# Patient Record
Sex: FEMALE | Race: WHITE | URBAN - METROPOLITAN AREA
[De-identification: names, ages, dates, MRNs, and addresses within clinical notes are randomized per-mention and may not be internally consistent; named-entity substitution may affect disease eponyms.]

---

## 2020-01-27 ENCOUNTER — OFFICE VISIT (OUTPATIENT)
Dept: FAMILY MEDICINE CLINIC | Facility: CLINIC | Age: 20
End: 2020-01-27
Payer: COMMERCIAL

## 2020-01-27 VITALS
HEIGHT: 59.84 IN | HEART RATE: 73 BPM | BODY MASS INDEX: 37.69 KG/M2 | SYSTOLIC BLOOD PRESSURE: 116 MMHG | TEMPERATURE: 98 F | DIASTOLIC BLOOD PRESSURE: 73 MMHG | WEIGHT: 192 LBS

## 2020-01-27 DIAGNOSIS — J18.9 COMMUNITY ACQUIRED PNEUMONIA OF LEFT LUNG, UNSPECIFIED PART OF LUNG: Primary | ICD-10-CM

## 2020-01-27 PROCEDURE — 90471 IMMUNIZATION ADMIN: CPT | Performed by: FAMILY MEDICINE

## 2020-01-27 PROCEDURE — 99202 OFFICE O/P NEW SF 15 MIN: CPT | Performed by: FAMILY MEDICINE

## 2020-01-27 PROCEDURE — 90686 IIV4 VACC NO PRSV 0.5 ML IM: CPT | Performed by: FAMILY MEDICINE

## 2020-01-27 NOTE — PROGRESS NOTES
HPI:    Kapil Harry is a 22year old female presents to clinic as a new patient to establish care. Patient was seen in the ER on Sunday, 1/19 with fevers, chills, body aches, and a cough.   She was diagnosed with community-acquired pneumonia, and st normal heart sounds. No murmur heard. Pulmonary/Chest: Effort normal and breath sounds normal. No respiratory distress. She has no wheezes. She has no rales. Lymphadenopathy:     She has no cervical adenopathy. Neurological: She is alert.    Vitals r

## 2020-04-20 ENCOUNTER — TELEMEDICINE (OUTPATIENT)
Dept: FAMILY MEDICINE CLINIC | Facility: CLINIC | Age: 20
End: 2020-04-20

## 2020-04-20 DIAGNOSIS — J30.9 ALLERGIC RHINITIS, UNSPECIFIED SEASONALITY, UNSPECIFIED TRIGGER: Primary | ICD-10-CM

## 2020-04-20 PROCEDURE — 99213 OFFICE O/P EST LOW 20 MIN: CPT | Performed by: FAMILY MEDICINE

## 2020-04-20 RX ORDER — FLUTICASONE PROPIONATE 50 MCG
2 SPRAY, SUSPENSION (ML) NASAL DAILY
Qty: 3 BOTTLE | Refills: 3 | Status: SHIPPED | OUTPATIENT
Start: 2020-04-20 | End: 2021-04-15

## 2020-04-20 NOTE — PROGRESS NOTES
HPI:    Kymberly Tinsley is a 23year old female presents for video visit. For the past 4 days patient has been waking up with headaches, nasal congestion, and sneezing. Has an occasional dry cough.   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 No other symptoms concerning for viral or bacterial infection. Letter written clearing patient to return to work. Patient will update me via my chart in 1 week.     Responsible party/patient verbalized understanding of information

## 2020-05-05 ENCOUNTER — OFFICE VISIT (OUTPATIENT)
Dept: FAMILY MEDICINE CLINIC | Facility: CLINIC | Age: 20
End: 2020-05-05
Payer: COMMERCIAL

## 2020-05-05 VITALS
SYSTOLIC BLOOD PRESSURE: 130 MMHG | DIASTOLIC BLOOD PRESSURE: 90 MMHG | HEIGHT: 59 IN | HEART RATE: 89 BPM | WEIGHT: 189.38 LBS | BODY MASS INDEX: 38.18 KG/M2 | TEMPERATURE: 98 F

## 2020-05-05 DIAGNOSIS — R10.2 VAGINAL PAIN: Primary | ICD-10-CM

## 2020-05-05 PROCEDURE — 99213 OFFICE O/P EST LOW 20 MIN: CPT | Performed by: FAMILY MEDICINE

## 2020-05-05 NOTE — PROGRESS NOTES
HPI:    Nenita Lynch is a 23year old female presents to clinic with concerns regarding vaginal pain. Started 3 days back, patient felt a small bump on the left side of her labia. Used hot compresses and avoided tight underwear.   Reports that pain/ Visit:  No orders of the defined types were placed in this encounter.       Meds This Visit:  Requested Prescriptions      No prescriptions requested or ordered in this encounter       Imaging & Referrals:  None     This note was created by [facility] voice rec

## 2020-07-08 ENCOUNTER — PATIENT MESSAGE (OUTPATIENT)
Dept: FAMILY MEDICINE CLINIC | Facility: CLINIC | Age: 20
End: 2020-07-08

## 2020-07-09 ENCOUNTER — NURSE TRIAGE (OUTPATIENT)
Dept: FAMILY MEDICINE CLINIC | Facility: CLINIC | Age: 20
End: 2020-07-09

## 2020-07-09 NOTE — TELEPHONE ENCOUNTER
Please call patient RE: message below--[employer] message sent to patient to return call and upload photo if possible    Allison Do MD 8 hours ago (11:17 PM)        Hello,     I hope all is well!  I am reaching out because my

## 2020-07-09 NOTE — TELEPHONE ENCOUNTER
Action Requested: Summary for Provider     []  Critical Lab, Recommendations Needed  [] Need Additional Advice  [x]   FYI    []   Need Orders  [] Need Medications Sent to Pharmacy  []  Other     SUMMARY: For the last 3 weeks right foot skin around the toes

## 2020-07-09 NOTE — TELEPHONE ENCOUNTER
From: Duke Mcclain  To: Dionisio Rodarte MD  Sent: 7/8/2020 11:17 PM CDT  Subject: Other    Hello,    I hope all is well! I am reaching out because my right foot has been very itchy & has been peeling in a thick skin layer for about 3 weeks now.  It looke

## 2020-07-11 ENCOUNTER — OFFICE VISIT (OUTPATIENT)
Dept: FAMILY MEDICINE CLINIC | Facility: CLINIC | Age: 20
End: 2020-07-11
Payer: COMMERCIAL

## 2020-07-11 VITALS
WEIGHT: 185.19 LBS | TEMPERATURE: 98 F | HEART RATE: 77 BPM | RESPIRATION RATE: 18 BRPM | SYSTOLIC BLOOD PRESSURE: 121 MMHG | BODY MASS INDEX: 37 KG/M2 | DIASTOLIC BLOOD PRESSURE: 82 MMHG

## 2020-07-11 DIAGNOSIS — B35.3 TINEA PEDIS OF RIGHT FOOT: Primary | ICD-10-CM

## 2020-07-11 PROCEDURE — 99213 OFFICE O/P EST LOW 20 MIN: CPT | Performed by: FAMILY MEDICINE

## 2020-07-11 RX ORDER — PRENATAL VIT 91/IRON/FOLIC/DHA 28-975-200
1 COMBINATION PACKAGE (EA) ORAL 2 TIMES DAILY
Qty: 42 G | Refills: 0 | Status: SHIPPED | OUTPATIENT
Start: 2020-07-11 | End: 2020-08-01

## 2020-07-11 NOTE — PROGRESS NOTES
HPI:    Patient ID: Adrienne Anderson is a 23year old female. Skin   This is a new problem. The current episode started 1 to 4 weeks ago. The problem occurs daily. The problem has been unchanged. Associated symptoms include a rash.  Pertinent negatives i

## 2020-10-08 ENCOUNTER — PATIENT MESSAGE (OUTPATIENT)
Dept: FAMILY MEDICINE CLINIC | Facility: CLINIC | Age: 20
End: 2020-10-08

## 2020-10-09 ENCOUNTER — NURSE TRIAGE (OUTPATIENT)
Dept: FAMILY MEDICINE CLINIC | Facility: CLINIC | Age: 20
End: 2020-10-09

## 2020-10-09 NOTE — TELEPHONE ENCOUNTER
From: Mery Munoz  To: Ronnie Galloway MD  Sent: 10/8/2020 9:39 PM CDT  Subject: Other    Hello,     I am sorry to bother you! I got my nipples pierced roughly about a month and a half ago.  I noticed a redish/whiteish lump grow ; it bled and pus starte

## 2020-10-09 NOTE — TELEPHONE ENCOUNTER
Action Requested: Summary for Provider     []  Critical Lab, Recommendations Needed  [] Need Additional Advice  [x]   FYI    []   Need Orders  [] Need Medications Sent to Pharmacy  []  Other     SUMMARY:   Called the patient to triage regarding mychart mes

## 2020-10-09 NOTE — TELEPHONE ENCOUNTER
Advised to call Triage on a [employer] message    ----- Message from Delia Salazar sent at 10/8/2020  9:39 PM CDT -----  Regarding: Other  Contact: [phone],     I am sorry to bother you!  I got my nipples pierced roughly about a month and a half

## 2021-02-01 ENCOUNTER — TELEMEDICINE (OUTPATIENT)
Dept: FAMILY MEDICINE CLINIC | Facility: CLINIC | Age: 21
End: 2021-02-01

## 2021-02-01 DIAGNOSIS — R53.83 FATIGUE, UNSPECIFIED TYPE: ICD-10-CM

## 2021-02-01 DIAGNOSIS — R52 BODY ACHES: ICD-10-CM

## 2021-02-01 DIAGNOSIS — R50.9 FEVER, UNSPECIFIED FEVER CAUSE: Primary | ICD-10-CM

## 2021-02-01 DIAGNOSIS — J02.9 SORE THROAT: ICD-10-CM

## 2021-02-01 PROCEDURE — 99213 OFFICE O/P EST LOW 20 MIN: CPT | Performed by: FAMILY MEDICINE

## 2021-02-01 RX ORDER — AMOXICILLIN AND CLAVULANATE POTASSIUM 875; 125 MG/1; MG/1
1 TABLET, FILM COATED ORAL 2 TIMES DAILY
Qty: 20 TABLET | Refills: 0 | Status: SHIPPED | OUTPATIENT
Start: 2021-02-01 | End: 2021-02-11

## 2021-02-01 NOTE — PROGRESS NOTES
HPI:    Olegario Elias is a 23year old female presents for video visit with 1 week history of low-grade fevers, chills, sore throat, cough, and body aches.   Symptoms started 2 days after patient returned from [facility], was Covid tested 5 days upon her r for clearance to return to work. Please note that the following visit was completed using two-way, real-time interactive audio and video communication.   This has been done in good vivienne to provide continuity of care in the best interest of the provider-

## 2021-02-08 ENCOUNTER — TELEMEDICINE (OUTPATIENT)
Dept: FAMILY MEDICINE CLINIC | Facility: CLINIC | Age: 21
End: 2021-02-08

## 2021-02-08 DIAGNOSIS — N94.9 VAGINAL DISCOMFORT: ICD-10-CM

## 2021-02-08 DIAGNOSIS — J06.9 VIRAL URI: Primary | ICD-10-CM

## 2021-02-08 PROCEDURE — 99213 OFFICE O/P EST LOW 20 MIN: CPT | Performed by: FAMILY MEDICINE

## 2021-02-08 NOTE — PROGRESS NOTES
HPI:    Nenita Lynch is a 23year old female presents for video visit for follow-up. Reports that symptoms of her upper respiratory infection are improving. Has not had a fever for 5 days. Cough is improving, still has some nasal congestion.   Had discomfort  Plan:  -Advised warm compresses. To continue to wear loose clothing. Advised against attempting to shave or any sort of hair removal in this area. Requesting an office exam, appointment made for tomorrow afternoon.   Patient agreeable to plan

## 2021-02-09 ENCOUNTER — OFFICE VISIT (OUTPATIENT)
Dept: FAMILY MEDICINE CLINIC | Facility: CLINIC | Age: 21
End: 2021-02-09
Payer: COMMERCIAL

## 2021-02-09 ENCOUNTER — LAB ENCOUNTER (OUTPATIENT)
Dept: LAB | Age: 21
End: 2021-02-09
Attending: FAMILY MEDICINE
Payer: COMMERCIAL

## 2021-02-09 VITALS
DIASTOLIC BLOOD PRESSURE: 83 MMHG | WEIGHT: 177 LBS | SYSTOLIC BLOOD PRESSURE: 125 MMHG | HEIGHT: 59 IN | TEMPERATURE: 98 F | HEART RATE: 80 BPM | RESPIRATION RATE: 16 BRPM | BODY MASS INDEX: 35.68 KG/M2

## 2021-02-09 DIAGNOSIS — J06.9 VIRAL URI WITH COUGH: ICD-10-CM

## 2021-02-09 DIAGNOSIS — Z11.3 SCREENING EXAMINATION FOR STD (SEXUALLY TRANSMITTED DISEASE): ICD-10-CM

## 2021-02-09 DIAGNOSIS — N94.9 GENITAL LESION, FEMALE: Primary | ICD-10-CM

## 2021-02-09 DIAGNOSIS — Z71.85 HPV VACCINE COUNSELING: ICD-10-CM

## 2021-02-09 LAB
C TRACH DNA SPEC QL NAA+PROBE: NEGATIVE
N GONORRHOEA DNA SPEC QL NAA+PROBE: NEGATIVE

## 2021-02-09 PROCEDURE — 3074F SYST BP LT 130 MM HG: CPT | Performed by: FAMILY MEDICINE

## 2021-02-09 PROCEDURE — 99214 OFFICE O/P EST MOD 30 MIN: CPT | Performed by: FAMILY MEDICINE

## 2021-02-09 PROCEDURE — 90471 IMMUNIZATION ADMIN: CPT | Performed by: FAMILY MEDICINE

## 2021-02-09 PROCEDURE — 87491 CHLMYD TRACH DNA AMP PROBE: CPT

## 2021-02-09 PROCEDURE — 90651 9VHPV VACCINE 2/3 DOSE IM: CPT | Performed by: FAMILY MEDICINE

## 2021-02-09 PROCEDURE — 3008F BODY MASS INDEX DOCD: CPT | Performed by: FAMILY MEDICINE

## 2021-02-09 PROCEDURE — 86780 TREPONEMA PALLIDUM: CPT

## 2021-02-09 PROCEDURE — 3079F DIAST BP 80-89 MM HG: CPT | Performed by: FAMILY MEDICINE

## 2021-02-09 PROCEDURE — 87389 HIV-1 AG W/HIV-1&-2 AB AG IA: CPT

## 2021-02-09 PROCEDURE — 36415 COLL VENOUS BLD VENIPUNCTURE: CPT

## 2021-02-09 PROCEDURE — 87591 N.GONORRHOEAE DNA AMP PROB: CPT

## 2021-02-09 NOTE — PROGRESS NOTES
HPI:    Lila Ogden is a 23year old female presents to clinic for follow-up. Last week, patient had symptoms of viral URI, tested negative for Covid. Still has some nasal congestion and a mild dry cough. Needs clearance to return to work.   Also, oropharynx is clear and moist and mucous membranes are normal.   Eyes: Pupils are equal, round, and reactive to light. Conjunctivae and EOM are normal.   Neck: Normal range of motion. Neck supple. No thyromegaly present.    Cardiovascular: Normal rate, regu HUMAN PAPILLOMA VIRUS VACC 9 FRANCISCO 3 DOSE IM       This note was created by [employer] voice recognition.  Errors in content may be related to improper recognition by the system; efforts to review and correct have been done but errors may still exist. Please cont

## 2021-02-10 ENCOUNTER — TELEPHONE (OUTPATIENT)
Dept: FAMILY MEDICINE CLINIC | Facility: CLINIC | Age: 21
End: 2021-02-10

## 2021-02-10 LAB
HSV1 DNA SPEC QL NAA+PROBE: POSITIVE
HSV2 DNA SPEC QL NAA+PROBE: NEGATIVE
T PALLIDUM AB SER QL: NEGATIVE

## 2021-02-10 RX ORDER — VALACYCLOVIR HYDROCHLORIDE 1 G/1
1 TABLET, FILM COATED ORAL EVERY 12 HOURS SCHEDULED
Qty: 14 TABLET | Refills: 0 | Status: SHIPPED | OUTPATIENT
Start: 2021-02-10 | End: 2021-02-17

## 2021-02-10 NOTE — TELEPHONE ENCOUNTER
Positive HSV swab discussed with patient. Valtrex 1 g twice daily x7 days to pharmacy. Safe sexual practices advised. HSV and potential future flareups discussed.   Follow-up as needed    Responsible party/patient verbalized understanding of information

## 2021-03-02 ENCOUNTER — PATIENT MESSAGE (OUTPATIENT)
Dept: FAMILY MEDICINE CLINIC | Facility: CLINIC | Age: 21
End: 2021-03-02

## 2021-03-02 ENCOUNTER — NURSE TRIAGE (OUTPATIENT)
Dept: FAMILY MEDICINE CLINIC | Facility: CLINIC | Age: 21
End: 2021-03-02

## 2021-03-02 NOTE — TELEPHONE ENCOUNTER
Encounter routed to the triage pool for RN follow-up.         ----- Message from Dominic Justin sent at 3/2/2021  9:56 AM CST -----  Regarding: Other  Contact: [address],     I'm sorry to bother so much.  There's always something wrong

## 2021-03-02 NOTE — TELEPHONE ENCOUNTER
From: Matthew De Paz  To: Geoffrey Sorenson MD  Sent: 3/2/2021 9:56 AM CST  Subject: Other    [address],     I'm sorry to bother so much. There's always something wrong with me!      My right leg hurts when I walk and put pressure on it closer to my pel

## 2021-03-02 NOTE — TELEPHONE ENCOUNTER
Action Requested: Summary for Provider     []  Critical Lab, Recommendations Needed  [] Need Additional Advice  []   FYI    []   Need Orders  [] Need Medications Sent to Pharmacy  []  Other     SUMMARY:   Reports right leg pain/ groin area for 7 days, wors

## 2021-03-03 ENCOUNTER — OFFICE VISIT (OUTPATIENT)
Dept: FAMILY MEDICINE CLINIC | Facility: CLINIC | Age: 21
End: 2021-03-03
Payer: COMMERCIAL

## 2021-03-03 VITALS
DIASTOLIC BLOOD PRESSURE: 84 MMHG | WEIGHT: 180 LBS | HEART RATE: 76 BPM | HEIGHT: 59 IN | SYSTOLIC BLOOD PRESSURE: 130 MMHG | BODY MASS INDEX: 36.29 KG/M2 | TEMPERATURE: 98 F

## 2021-03-03 DIAGNOSIS — M79.604 DIFFUSE PAIN IN RIGHT LOWER EXTREMITY: ICD-10-CM

## 2021-03-03 DIAGNOSIS — M25.559 HIP PAIN: ICD-10-CM
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
## 2021-03-03 PROCEDURE — 3079F DIAST BP 80-89 MM HG: CPT | Performed by: FAMILY MEDICINE

## 2021-03-03 PROCEDURE — 3075F SYST BP GE 130 - 139MM HG: CPT | Performed by: FAMILY MEDICINE

## 2021-03-03 PROCEDURE — 99214 OFFICE O/P EST MOD 30 MIN: CPT | Performed by: FAMILY MEDICINE

## 2021-03-03 PROCEDURE — 3008F BODY MASS INDEX DOCD: CPT | Performed by: FAMILY MEDICINE

## 2021-03-03 RX ORDER — NAPROXEN 500 MG/1
500 TABLET ORAL 2 TIMES DAILY WITH MEALS
Qty: 60 TABLET | Refills: 0 | Status: SHIPPED | OUTPATIENT
Start: 2021-03-03

## 2021-03-03 NOTE — PROGRESS NOTES
HPI:    Lia Haines is a 23year old female presents to clinic with concerns regarding right lower extremity pain. About 10 days back, patient slipped while trying to remove snow from her boyfriend's car.   Reports that she slipped on her right foot pain with flexion/extension  Antalgic gait   Neurological: She is alert. Psychiatric: She has a normal mood and affect. Vitals reviewed.       ASSESSMENT/PLAN:   (Y90.VZLR) Injury due to fall, initial encounter  (primary encounter diagnosis)  (M25.557)

## 2021-03-06 ENCOUNTER — HOSPITAL ENCOUNTER (OUTPATIENT)
Dept: GENERAL RADIOLOGY | Age: 21
Discharge: HOME | End: 2021-03-06
Attending: FAMILY MEDICINE
Payer: COMMERCIAL

## 2021-03-06 DIAGNOSIS — M79.604 DIFFUSE PAIN IN RIGHT LOWER EXTREMITY: ICD-10-CM

## 2021-03-06 DIAGNOSIS — M25.559 HIP PAIN: ICD-10-CM

## 2021-03-06 PROCEDURE — 73502 X-RAY EXAM HIP UNI 2-3 VIEWS: CPT | Performed by: FAMILY MEDICINE

## 2021-03-06 PROCEDURE — 73564 X-RAY EXAM KNEE 4 OR MORE: CPT | Performed by: FAMILY MEDICINE

## 2021-04-03 ENCOUNTER — NURSE TRIAGE (OUTPATIENT)
Dept: FAMILY MEDICINE CLINIC | Facility: CLINIC | Age: 21
End: 2021-04-03

## 2021-04-03 NOTE — TELEPHONE ENCOUNTER
Action Requested: Summary for Provider     []  Critical Lab, Recommendations Needed  [] Need Additional Advice  []   FYI    []   Need Orders  [] Need Medications Sent to Pharmacy  []  Other     SUMMARY:   Blood in stool today.   Feels its related to Scott Baker

## 2021-04-03 NOTE — TELEPHONE ENCOUNTER
Patient reports blood in stool.     Appointment For: Karolina Love ([id number])   Visit Type: Lou Rich ([id number])      4/7/2021     1:15 PM  15 mins. Debi Collet, MD        [facility]      Patient Comments:   Blood in stool

## 2021-04-09 DIAGNOSIS — Z23 NEED FOR VACCINATION: ICD-10-CM

## 2021-09-22 ENCOUNTER — OFFICE VISIT (OUTPATIENT)
Dept: FAMILY MEDICINE CLINIC | Facility: CLINIC | Age: 21
End: 2021-09-22
Payer: COMMERCIAL

## 2021-09-22 VITALS
TEMPERATURE: 98 F | DIASTOLIC BLOOD PRESSURE: 88 MMHG | HEART RATE: 68 BPM | WEIGHT: 185.5 LBS | HEIGHT: 59 IN | SYSTOLIC BLOOD PRESSURE: 137 MMHG | BODY MASS INDEX: 37.4 KG/M2

## 2021-09-22 DIAGNOSIS — N89.8 VAGINAL ITCHING: Primary | ICD-10-CM

## 2021-09-22 DIAGNOSIS — Z12.4 PAP SMEAR FOR CERVICAL CANCER SCREENING: ICD-10-CM

## 2021-09-22 DIAGNOSIS — Z23 FLU VACCINE NEED: ICD-10-CM

## 2021-09-22 DIAGNOSIS — B00.9 HSV INFECTION: ICD-10-CM

## 2021-09-22 DIAGNOSIS — N92.6 MISSED MENSES: ICD-10-CM

## 2021-09-22 LAB
CONTROL LINE PRESENT WITH A CLEAR BACKGROUND (YES/NO): YES YES/NO
PREGNANCY TEST, URINE: NEGATIVE

## 2021-09-22 PROCEDURE — 3075F SYST BP GE 130 - 139MM HG: CPT | Performed by: FAMILY MEDICINE

## 2021-09-22 PROCEDURE — 90686 IIV4 VACC NO PRSV 0.5 ML IM: CPT | Performed by: FAMILY MEDICINE

## 2021-09-22 PROCEDURE — 81025 URINE PREGNANCY TEST: CPT | Performed by: FAMILY MEDICINE

## 2021-09-22 PROCEDURE — 3008F BODY MASS INDEX DOCD: CPT | Performed by: FAMILY MEDICINE

## 2021-09-22 PROCEDURE — 90471 IMMUNIZATION ADMIN: CPT | Performed by: FAMILY MEDICINE

## 2021-09-22 PROCEDURE — 3079F DIAST BP 80-89 MM HG: CPT | Performed by: FAMILY MEDICINE

## 2021-09-22 PROCEDURE — 99214 OFFICE O/P EST MOD 30 MIN: CPT | Performed by: FAMILY MEDICINE

## 2021-09-22 RX ORDER — VALACYCLOVIR HYDROCHLORIDE 1 G/1
1 TABLET, FILM COATED ORAL EVERY 12 HOURS SCHEDULED
Qty: 30 TABLET | Refills: 1 | Status: SHIPPED | OUTPATIENT
Start: 2021-09-22

## 2021-09-22 NOTE — PROGRESS NOTES
HPI:    Christiana Evans is a 19year old female presents to clinic with concerns regarding vaginal itching. Symptoms started a few days back, thought she was getting yeast infection.   Has not noticed discharge, thinks she is experiencing seeing a flare ASSESSMENT/PLAN:   (N89.8) Vaginal itching  (primary encounter diagnosis)  (N92.6) Missed menses  (Z12.4) Pap smear for cervical cancer screening  (B00.9) HSV infection  Plan:   -No discharge seen on exam, HSV lesions seen on left labia.   Valtrex t

## 2021-09-23 LAB
C TRACH DNA SPEC QL NAA+PROBE: NEGATIVE
N GONORRHOEA DNA SPEC QL NAA+PROBE: NEGATIVE

## 2021-09-24 LAB
GENITAL VAGINOSIS SCREEN: POSITIVE
TRICHOMONAS SCREEN: NEGATIVE

## 2021-09-28 LAB — HPV I/H RISK 1 DNA SPEC QL NAA+PROBE: NEGATIVE

## 2021-09-29 LAB — LAST PAP RESULT: NORMAL

## 2021-10-05 RX ORDER — METRONIDAZOLE 500 MG/1
500 TABLET ORAL 2 TIMES DAILY
Qty: 14 TABLET | Refills: 0 | Status: SHIPPED | OUTPATIENT
Start: 2021-10-05 | End: 2021-10-12

## 2022-01-21 NOTE — PATIENT INSTRUCTIONS
Athlete’s Foot     Athlete’s foot (tinea pedis) is caused by a fungal infection in the skin. It affects the skin between the toes, causing cracks in the skin called fissures.  It can also affect the bottom of the foot where it causes dry white scales and · Increasing redness or swelling of the foot  · Infection comes back soon after treatment  · Pus draining from cracks in the skin  [place] last reviewed this educational content on 7/1/2019  © [id number] [employer].  [address] Peng Advancement Flap Text: The defect edges were debeveled with a #15 scalpel blade.  Given the location of the defect, shape of the defect and the proximity to free margins a Peng advancement flap was deemed most appropriate.  Using a sterile surgical marker, an appropriate advancement flap was drawn incorporating the defect and placing the expected incisions within the relaxed skin tension lines where possible. The area thus outlined was incised deep to adipose tissue with a #15 scalpel blade.  The skin margins were undermined to an appropriate distance in all directions utilizing iris scissors.

## 2024-11-07 ENCOUNTER — NURSE TRIAGE (OUTPATIENT)
Dept: FAMILY MEDICINE CLINIC | Facility: CLINIC | Age: 24
End: 2024-11-07

## 2024-11-07 ENCOUNTER — OFFICE VISIT (OUTPATIENT)
Dept: INTERNAL MEDICINE CLINIC | Facility: CLINIC | Age: 24
End: 2024-11-07

## 2024-11-07 VITALS
WEIGHT: 170 LBS | DIASTOLIC BLOOD PRESSURE: 85 MMHG | BODY MASS INDEX: 34.27 KG/M2 | HEIGHT: 59 IN | HEART RATE: 81 BPM | SYSTOLIC BLOOD PRESSURE: 141 MMHG

## 2024-11-07 DIAGNOSIS — N93.9 ABNORMAL UTERINE BLEEDING (AUB): Primary | ICD-10-CM

## 2024-11-07 DIAGNOSIS — Z11.3 VENEREAL DISEASE SCREENING: ICD-10-CM

## 2024-11-07 PROCEDURE — 3079F DIAST BP 80-89 MM HG: CPT | Performed by: NURSE PRACTITIONER

## 2024-11-07 PROCEDURE — 3008F BODY MASS INDEX DOCD: CPT | Performed by: NURSE PRACTITIONER

## 2024-11-07 PROCEDURE — 3077F SYST BP >= 140 MM HG: CPT | Performed by: NURSE PRACTITIONER

## 2024-11-07 PROCEDURE — 99203 OFFICE O/P NEW LOW 30 MIN: CPT | Performed by: NURSE PRACTITIONER

## 2024-11-07 NOTE — TELEPHONE ENCOUNTER
Action Requested: Summary for Provider     []  Critical Lab, Recommendations Needed  [] Need Additional Advice  [x]   FYI    []   Need Orders  [] Need Medications Sent to Pharmacy  []  Other     SUMMARY: Patient stated that last year she had a miscarriage. Since then has noticed that every time she does the stair master or any abdominal workouts the next day she starts having abdominal pressure in the middle below the belly button and then starts having vaginal spotting and bleeding for about 2-4 days.  Last time she worked out was on 11/2/2024 and did the stair master for about 30 minutes and started having the abdominal pressure. Patient stated that when she was pregnant and spotting last year she went to the emergency room and they did see she had ovarian cysts. No other symptoms. Patient not sure if symptoms related to her miscarriage last year but wanted to get evaluated to see what is going on. No appointments in [place] today, not until next week. Appointment made for today at 7pm with Shital Thompson in [place]-address provided.   Reason for call: Vaginal Bleeding (Abnormal vaginal bleeding)  Onset: Data Unavailable     Reason for Disposition   Patient wants to be seen    Protocols used: Vaginal Bleeding - [id number]

## 2024-11-08 NOTE — PROGRESS NOTES
Lorri Pritchard is a 24 year old female.  HPI:   Pt is new to me.   She c/o irregular vaginal bleeding/spotting that occurs with strenuous activity such as working out. This is ongoing to a few months. She reports some pelvic pain as well. She reports regular menses every 28 days. Reports she is sexually active, male partner, no new partners, denies any vaginal discharge, fever, chills, urinary. Denies any pain with intercourse or bleeding.   LMP 10/24/2024  , miscarriage.   Current Outpatient Medications   Medication Sig Dispense Refill    valACYclovir 1 G Oral Tab Take 1 tablet (1,000 mg total) by mouth every 12 (twelve) hours. 30 tablet 1    naproxen 500 MG Oral Tab Take 1 tablet (500 mg total) by mouth 2 (two) times daily with meals. (Patient not taking: Reported on 2024) 60 tablet 0      History reviewed. No pertinent past medical history.   Social History:  Social History     Socioeconomic History    Marital status: Single   Tobacco Use    Smoking status: Never    Smokeless tobacco: Never   Vaping Use    Vaping status: Never Used   Substance and Sexual Activity    Alcohol use: Not Currently    Drug use: Never        REVIEW OF SYSTEMS:   Review of Systems   Constitutional:  Negative for activity change, appetite change, fatigue, fever and unexpected weight change.   HENT:  Negative for congestion, dental problem and sore throat.    Eyes:  Negative for visual disturbance.   Respiratory:  Negative for cough, chest tightness, shortness of breath and wheezing.    Cardiovascular:  Negative for chest pain, palpitations and leg swelling.   Gastrointestinal:  Negative for abdominal pain, constipation, diarrhea, nausea and vomiting.   Endocrine: Negative.    Genitourinary:  Negative for difficulty urinating, frequency and menstrual problem.   Musculoskeletal:  Negative for arthralgias and back pain.   Skin:  Negative for color change, pallor, rash and wound.   Neurological:  Negative for dizziness, seizures,  light-headedness, numbness and headaches.   Psychiatric/Behavioral:  Negative for behavioral problems, dysphoric mood and suicidal ideas. The patient is not nervous/anxious.           EXAM:   /85 (BP Location: Left arm, Patient Position: Sitting, Cuff Size: adult)   Pulse 81   Ht 4' 11\" (1.499 m)   Wt 170 lb (77.1 kg)   BMI 34.34 kg/m²     Physical Exam  Vitals reviewed.   Constitutional:       General: She is not in acute distress.     Appearance: Normal appearance. She is normal weight. She is not ill-appearing.   HENT:      Head: Normocephalic and atraumatic.   Eyes:      General: No scleral icterus.     Conjunctiva/sclera: Conjunctivae normal.      Pupils: Pupils are equal, round, and reactive to light.   Neck:      Thyroid: No thyroid mass or thyromegaly.   Cardiovascular:      Rate and Rhythm: Normal rate and regular rhythm.      Pulses: Normal pulses.      Heart sounds: Normal heart sounds.   Pulmonary:      Effort: Pulmonary effort is normal. No respiratory distress.      Breath sounds: Normal breath sounds.   Abdominal:      General: Abdomen is flat. Bowel sounds are normal. There is no distension.      Palpations: Abdomen is soft. There is no mass.      Tenderness: There is no abdominal tenderness. There is no right CVA tenderness, left CVA tenderness, guarding or rebound.      Hernia: No hernia is present.   Genitourinary:     Comments: Pt deferred  Musculoskeletal:         General: Normal range of motion.      Cervical back: Normal range of motion and neck supple.      Right lower leg: No edema.      Left lower leg: No edema.   Lymphadenopathy:      Cervical: No cervical adenopathy.   Skin:     General: Skin is warm and dry.      Coloration: Skin is not jaundiced.   Neurological:      General: No focal deficit present.      Mental Status: She is alert and oriented to person, place, and time.      Motor: Motor function is intact.      Gait: Gait normal.   Psychiatric:         Mood and Affect:  Mood normal.         Judgment: Judgment normal.            ASSESSMENT AND PLAN:   1. Abnormal uterine bleeding (AUB)  -Labs per orders.   -Ordering pelvic US  -Referring to gynecology.   - TSH W Reflex To Free T4 [E]; Future  - CBC W Differential W Platelet [E]; Future  - US PELVIS (TRANSABDOMINAL AND TRANSVAGINAL) (CPT=76856/71368); Future  - OBG Referral - In Network  - HCG, Beta Subunit (Quant Pregnancy Test); Future    2. Venereal disease screening  - HIV AG AB Combo [E]; Future  - Hepatitis Panel, Acute (4) [E]; Future  - T Pallidum Screening [place] TREP [E]; Future  - Chlamydia/Gc Amplification; Future       The patient indicates understanding of these issues and agrees to the plan.  The patient is asked to return in PRN/4 weeks.     The above note was creating using Dragon speech recognition technology. Please excuse any typos.

## 2024-11-09 ENCOUNTER — LAB ENCOUNTER (OUTPATIENT)
Dept: LAB | Age: 24
End: 2024-11-09
Attending: NURSE PRACTITIONER
Payer: COMMERCIAL

## 2024-11-09 DIAGNOSIS — N93.9 ABNORMAL UTERINE BLEEDING (AUB): ICD-10-CM

## 2024-11-09 DIAGNOSIS — Z11.3 VENEREAL DISEASE SCREENING: ICD-10-CM

## 2024-11-09 DIAGNOSIS — D64.9 ANEMIA, UNSPECIFIED TYPE: ICD-10-CM

## 2024-11-09 LAB
B-HCG SERPL-ACNC: <2.6 MIU/ML
BASOPHILS # BLD AUTO: 0.04 X10(3) UL (ref 0–0.2)
BASOPHILS NFR BLD AUTO: 0.6 %
DEPRECATED RDW RBC AUTO: 45.1 FL (ref 35.1–46.3)
EOSINOPHIL # BLD AUTO: 0.09 X10(3) UL (ref 0–0.7)
EOSINOPHIL NFR BLD AUTO: 1.3 %
ERYTHROCYTE [DISTWIDTH] IN BLOOD BY AUTOMATED COUNT: 17.7 % (ref 11–15)
HAV IGM SER QL: NONREACTIVE
HBV CORE IGM SER QL: NONREACTIVE
HBV SURFACE AG SERPL QL IA: NONREACTIVE
HCT VFR BLD AUTO: 31.9 %
HCV AB SERPL QL IA: NONREACTIVE
HGB BLD-MCNC: 9.5 G/DL
IMM GRANULOCYTES # BLD AUTO: 0.01 X10(3) UL (ref 0–1)
IMM GRANULOCYTES NFR BLD: 0.1 %
LYMPHOCYTES # BLD AUTO: 1.93 X10(3) UL (ref 1–4)
LYMPHOCYTES NFR BLD AUTO: 27.1 %
MCH RBC QN AUTO: 21 PG (ref 26–34)
MCHC RBC AUTO-ENTMCNC: 29.8 G/DL (ref 31–37)
MCV RBC AUTO: 70.4 FL
MONOCYTES # BLD AUTO: 0.66 X10(3) UL (ref 0.1–1)
MONOCYTES NFR BLD AUTO: 9.3 %
NEUTROPHILS # BLD AUTO: 4.38 X10 (3) UL (ref 1.5–7.7)
NEUTROPHILS # BLD AUTO: 4.38 X10(3) UL (ref 1.5–7.7)
NEUTROPHILS NFR BLD AUTO: 61.6 %
PLATELET # BLD AUTO: 303 10(3)UL (ref 150–450)
PLATELETS.RETICULATED NFR BLD AUTO: 9.3 % (ref 0–7)
RBC # BLD AUTO: 4.53 X10(6)UL
T PALLIDUM AB SER QL IA: NONREACTIVE
TSI SER-ACNC: 1.24 UIU/ML (ref 0.55–4.78)
WBC # BLD AUTO: 7.1 X10(3) UL (ref 4–11)

## 2024-11-09 PROCEDURE — 84466 ASSAY OF TRANSFERRIN: CPT

## 2024-11-09 PROCEDURE — 86780 TREPONEMA PALLIDUM: CPT

## 2024-11-09 PROCEDURE — 87389 HIV-1 AG W/HIV-1&-2 AB AG IA: CPT

## 2024-11-09 PROCEDURE — 84702 CHORIONIC GONADOTROPIN TEST: CPT

## 2024-11-09 PROCEDURE — 87491 CHLMYD TRACH DNA AMP PROBE: CPT

## 2024-11-09 PROCEDURE — 80074 ACUTE HEPATITIS PANEL: CPT

## 2024-11-09 PROCEDURE — 87591 N.GONORRHOEAE DNA AMP PROB: CPT

## 2024-11-09 PROCEDURE — 85025 COMPLETE CBC W/AUTO DIFF WBC: CPT

## 2024-11-09 PROCEDURE — 84443 ASSAY THYROID STIM HORMONE: CPT

## 2024-11-09 PROCEDURE — 83540 ASSAY OF IRON: CPT

## 2024-11-09 PROCEDURE — 36415 COLL VENOUS BLD VENIPUNCTURE: CPT

## 2024-11-09 PROCEDURE — 82728 ASSAY OF FERRITIN: CPT

## 2024-11-11 DIAGNOSIS — D64.9 ANEMIA, UNSPECIFIED TYPE: Primary | ICD-10-CM

## 2024-11-11 LAB
C TRACH DNA SPEC QL NAA+PROBE: NEGATIVE
DEPRECATED HBV CORE AB SER IA-ACNC: 2 NG/ML
IRON SATN MFR SERPL: 4 %
IRON SERPL-MCNC: 19 UG/DL
N GONORRHOEA DNA SPEC QL NAA+PROBE: NEGATIVE
TIBC SERPL-MCNC: 504 UG/DL (ref 250–425)
TRANSFERRIN SERPL-MCNC: 338 MG/DL (ref 250–380)

## 2024-11-12 DIAGNOSIS — D50.9 IRON DEFICIENCY ANEMIA, UNSPECIFIED IRON DEFICIENCY ANEMIA TYPE: Primary | ICD-10-CM

## 2024-11-12 RX ORDER — FERROUS SULFATE 325(65) MG
325 TABLET ORAL 2 TIMES DAILY
Qty: 180 TABLET | Refills: 1 | Status: SHIPPED | OUTPATIENT
Start: 2024-11-12

## 2024-11-14 ENCOUNTER — OFFICE VISIT (OUTPATIENT)
Dept: OBGYN CLINIC | Facility: CLINIC | Age: 24
End: 2024-11-14
Payer: COMMERCIAL

## 2024-11-14 VITALS
WEIGHT: 168 LBS | SYSTOLIC BLOOD PRESSURE: 123 MMHG | HEART RATE: 79 BPM | BODY MASS INDEX: 34 KG/M2 | DIASTOLIC BLOOD PRESSURE: 84 MMHG

## 2024-11-14 DIAGNOSIS — N93.9 ABNORMAL UTERINE BLEEDING (AUB): ICD-10-CM

## 2024-11-14 DIAGNOSIS — N83.201 CYST OF RIGHT OVARY: ICD-10-CM

## 2024-11-14 DIAGNOSIS — Z01.419 WELL WOMAN EXAM WITH ROUTINE GYNECOLOGICAL EXAM: Primary | ICD-10-CM

## 2024-11-14 DIAGNOSIS — D50.8 IRON DEFICIENCY ANEMIA SECONDARY TO INADEQUATE DIETARY IRON INTAKE: ICD-10-CM

## 2024-11-14 DIAGNOSIS — R03.0 ELEVATED BP WITHOUT DIAGNOSIS OF HYPERTENSION: ICD-10-CM

## 2024-11-14 PROCEDURE — 3079F DIAST BP 80-89 MM HG: CPT | Performed by: STUDENT IN AN ORGANIZED HEALTH CARE EDUCATION/TRAINING PROGRAM

## 2024-11-14 PROCEDURE — 3074F SYST BP LT 130 MM HG: CPT | Performed by: STUDENT IN AN ORGANIZED HEALTH CARE EDUCATION/TRAINING PROGRAM

## 2024-11-14 PROCEDURE — 99214 OFFICE O/P EST MOD 30 MIN: CPT | Performed by: STUDENT IN AN ORGANIZED HEALTH CARE EDUCATION/TRAINING PROGRAM

## 2024-11-14 NOTE — ASSESSMENT & PLAN NOTE
Regular menses with episodes of AUB that are provoked by intra-abdominal or pelvic pressure that started after her 2023 miscarriage, and are unrelated to intercourse (although not currently sexually active). Known ovarian cyst can be contributing to this (both via proximity/mass effect; less likely from hormonally active tissue within the cyst as most likely mature teratoma).  Exam with small ectropion that is likely contributing, but will review US to r/o other possible contributors (endocervical or endometrial polyp, submucosal fibroid). Negative for GC/CT, but vaginitis swab collected today as that could aggravate ectropion-related bleeding.

## 2024-11-14 NOTE — ASSESSMENT & PLAN NOTE
CBC with Hgb 9.5 on 11/7 with MCV 70 c/w chronic iron deficiency anemia. Menses are not excessively heavy so more likely inadequate intake; prescribed BID iron from previous provider on 11/7. Reviewed importance of effective supplementation given Hgb < 10 and discussed that nightly prenatal pill + ferrous sulfate first thing in the morning with orange juice will also create iron supplementation with other required nutrients (folate, etc) comparable to a multivitamin, and may cause less GI dysfunction.

## 2024-11-14 NOTE — PROGRESS NOTES
[facility], [place]  Obstetrics and Gynecology  Gynecology New Patient  Exam    Chief Complaint   Patient presents with    New Patient    Vaginal Problem     Irregular vaginal bleeding with workouts (stair master), for the past 3 months.     Pelvic Pain     For the past 3 months.            Lorri Pritchard is a 24 year old female presenting as a new patient for AUB and pelvic pain/pressure.    No spotting or bleeding after sex. Happens after stairmaster, abdomen/core, or heavy leg day. Started after miscarriage in summer 2023. Noticed increased pelvic pressure with these workouts; diagnosed with large ovarian cyst (6 cm dermoid on the right at that time as well). Initially thought having 2 periods a month, then realized over the last 3-4 months that it's distinct from a period because it's distinct; looks lighter red/pink and different smell. Still spotting now from last workout on 11/5. Comes and goes; never as heavy as her normal periods but has gotten as heavy as 2 tampons per day.      Pain is described as pressure and/or cramping during workouts that is equivalent to her period cramps. No sharp pain, not localized.     Diagnosed with anemia after recent bloodwork, prescribed iron BID.       Menstrual/Gyn Hx:   Menarche 8  No menses from 9389-8213 due to Nexplanon, then irregular for approx 1 year [not skipping, just coming at different times in the month]  Flow is/was regular, every 28-32 days and lasting 5-6 days, heaviest first half (4 soaked ultra tampons). Has some  dysmenorrhea (cramps, chills - never missed school/work, does not need ibuprofen or tylenol).  Reports no h/o abnormal Pap.. Last Pap 2021 and NILM. Completed Gardasil series.   h/o STI - genital HSV (dx 2021, last outbreak 2021)  No h/o fibroids, but dx with 6.9 cm suspected dermoid cyst in 2023. Has never had any type of GYN surgery.  Has  used birth control in the past; Nexplanon, no others.   Is not currently sexually active but 1 male partner in the  last year (unprotected), and reports no problems with intercourse.   Desires future fertility, not trying rn  Reports no issues with bowel or bladder function     Miscarrige at 7 wga in     Medications (Active prior to today's visit):  Current Outpatient Medications   Medication Sig Dispense Refill    Ferrous Sulfate 325 (65 Fe) MG Oral Tab Take 1 tablet (325 mg total) by mouth in the morning and 1 tablet (325 mg total) before bedtime. With orange juice.. 180 tablet 1    valACYclovir 1 G Oral Tab Take 1 tablet (1,000 mg total) by mouth every 12 (twelve) hours. (Patient not taking: Reported on 2024) 30 tablet 1    naproxen 500 MG Oral Tab Take 1 tablet (500 mg total) by mouth 2 (two) times daily with meals. (Patient not taking: Reported on 2021) 60 tablet 0     Allergies:  Allergies[1]  HISTORY:     OB History    Para Term  AB Living   1 0 0 0 1 0   SAB IAB Ectopic Multiple Live Births   1              # Outcome Date GA Lbr Mark/2nd Weight Sex Type Anes PTL Lv   1 SAB 23 7w0d    SAB   FD       History reviewed. No pertinent past medical history.    History reviewed. No pertinent surgical history.    Family History   Problem Relation Age of Onset    Hypertension Mother     Diabetes Maternal Grandfather        Social History     Socioeconomic History    Marital status: Single     Spouse name: Not on file    Number of children: Not on file    Years of education: Not on file    Highest education level: Not on file   Occupational History    Not on file   Tobacco Use    Smoking status: Never    Smokeless tobacco: Never   Vaping Use    Vaping status: Never Used   Substance and Sexual Activity    Alcohol use: Not Currently    Drug use: Never    Sexual activity: Not on file   Other Topics Concern    Not on file   Social History Narrative    Not on file     Social Drivers of Health     Financial Resource Strain: Not on file   Food Insecurity: Not on file   Transportation Needs: Not on file    Physical Activity: Not on file   Stress: Not on file   Social Connections: Not on file   Housing Stability: Not on file       ROS:   Review of Systems:    General: no fevers, chills, unintended weight loss/gain except as above  Cardiovascular: no chest pain, new or unexplained SOB except as above  Gastrointestinal: no nausea/vomiting, diarrhea, or blood in stool except as above  Respiratory: no new symptoms reported except as above  Skin: no new symptoms reported except as above  Psychiatric: no new symptoms reported except as above  PHYSICAL EXAM:   /84 (BP Location: Right arm, Patient Position: Sitting, Cuff Size: adult)   Pulse 79   Wt 168 lb (76.2 kg)   LMP 10/24/2024 (Exact Date)   BMI 33.93 kg/m²     Physical Exam  Genitourinary:     Comments: Minimally enlarged and slightly pale clitoral body. Normal vagina with small blood (no active bleeding) that is originating from the cervix/within the cervical os. Small ectropion seen with small bleeding with manipulation. Cervix slightly deviated to patient left. Bimanual exam notable for large palpable right adnexal mass c/w known dermoid with discomfort on palpation.           RESULTS & IMAGING         No results for input(s): \"URINEPREG\" in the last 72 hours.    No results for input(s): \"PGLU\", \"POCTGLUCOSE\" in the last 72 hours.    No results for input(s): \"GLUCOSEDIP\", \"BILIRUBIN\", \"KETONESDIP\", \"BLOODU\", \"PHURINE\", \"UROBILIN\", \"NITRITE\", \"LEUKOCYTES\", \"APPEARANCE\", \"URINECOLOR\" in the last 72 hours.    Invalid input(s): \"SPECGRAV\"    07/19/23 US ([place])  The right ovary measures 8.0 x 5.0 x 6.5 cm.    There is redemonstration of 6.4 x 4.5 x 5.3 cm complex cystic mass of the right ovary with some internal components suggestive of fat and may represent ovarian dermoid. The left ovary measures 3.3 x 2.5 x 2.9 cm.  Normal waveform    6/25/23 US ([place])  The right ovary measures 6.9 x 4.4 x 6.0 cm.    Complex right adnexal cystic   lesion measuring 6.9 x  4.4 x 6.0 cm with internal echogenic fat. The left ovary measures 2.8 x 1.8 x 2.4 cm. Normal Doppler waveforms bilaterally.     09/22/21 Pap NILM       ASSESSMENT & PLAN     Well woman exam with routine gynecological exam (Primary)  -     ThinPrep PAP with HPV Reflex Request B; Future; Expected date: 11/14/2024  Abnormal uterine bleeding (AUB)  Assessment & Plan:  Regular menses with episodes of AUB that are provoked by intra-abdominal or pelvic pressure that started after her 2023 miscarriage, and are unrelated to intercourse (although not currently sexually active). Known ovarian cyst can be contributing to this (both via proximity/mass effect; less likely from hormonally active tissue within the cyst as most likely mature teratoma).  Exam with small ectropion that is likely contributing, but will review US to r/o other possible contributors (endocervical or endometrial polyp, submucosal fibroid). Negative for GC/CT, but vaginitis swab collected today as that could aggravate ectropion-related bleeding.  Orders:  -     Vaginitis Vaginosis PCR Panel; Future; Expected date: 11/14/2024  Cyst of right ovary  Assessment & Plan:  Diagnosed with large right ovarian cyst, most likely dermoid given complex features and contains fat. Initial US in June 2023 described cyst size separate from ovary, while July US described it all together. Unclear if cyst is distinct from ovary at this time, but await US results to review current size and characteristic of cyst, and if demonstrating suspicious features or concern for malignant transformation.     Favor this as an etiology of her pelvic pain given reported history and exam findings. Discussed that while dermoid cysts are generally non-malignant and not hormonally active (so unrelated to her bleeding), a cyst of this size is within the 4-10 cm range that raises risk of torsion. Reviewed that ordinarily management of cysts of this type and size that are symptomatic is surgical  removal via laparoscopy or laparotomy. Will review updated US to assess cyst and character for surgical planning; patient will discuss her leave options with her workplace to identify optimal timing from a patient preference and medical decision-making standpoint.     Iron deficiency anemia secondary to inadequate dietary iron intake  Assessment & Plan:  CBC with Hgb 9.5 on 11/7 with MCV 70 c/w chronic iron deficiency anemia. Menses are not excessively heavy so more likely inadequate intake; prescribed BID iron from previous provider on 11/7. Reviewed importance of effective supplementation given Hgb < 10 and discussed that nightly prenatal pill + ferrous sulfate first thing in the morning with orange juice will also create iron supplementation with other required nutrients (folate, etc) comparable to a multivitamin, and may cause less GI dysfunction.   Elevated BP without diagnosis of hypertension  Assessment & Plan:  One elevated BP (140s/90s) at last visit, today in 130s/80s. Appreciate PCP review of her BP at next visit and discussion about any necessary management or evaluation.       F/u after US; will set a time for annual visit in the future    Leanna Pearl MD  11/14/2024  8:14 AM               [1] No Known Allergies

## 2024-11-14 NOTE — ASSESSMENT & PLAN NOTE
One elevated BP (140s/90s) at last visit, today in 130s/80s. Appreciate PCP review of her BP at next visit and discussion about any necessary management or evaluation.

## 2024-11-14 NOTE — ASSESSMENT & PLAN NOTE
Diagnosed with large right ovarian cyst, most likely dermoid given complex features and contains fat. Initial US in June 2023 described cyst size separate from ovary, while July US described it all together. Unclear if cyst is distinct from ovary at this time, but await US results to review current size and characteristic of cyst, and if demonstrating suspicious features or concern for malignant transformation.     Favor this as an etiology of her pelvic pain given reported history and exam findings. Discussed that while dermoid cysts are generally non-malignant and not hormonally active (so unrelated to her bleeding), a cyst of this size is within the 4-10 cm range that raises risk of torsion. Reviewed that ordinarily management of cysts of this type and size that are symptomatic is surgical removal via laparoscopy or laparotomy. Will review updated US to assess cyst and character for surgical planning; patient will discuss her leave options with her workplace to identify optimal timing from a patient preference and medical decision-making standpoint.

## 2024-11-15 LAB — LAST PAP RESULT: NORMAL

## 2024-11-16 LAB
BV BACTERIA DNA VAG QL NAA+PROBE: POSITIVE
C GLABRATA DNA VAG QL NAA+PROBE: NEGATIVE
C KRUSEI DNA VAG QL NAA+PROBE: NEGATIVE
CANDIDA DNA VAG QL NAA+PROBE: NEGATIVE
T VAGINALIS DNA VAG QL NAA+PROBE: NEGATIVE

## 2024-12-04 ENCOUNTER — HOSPITAL ENCOUNTER (OUTPATIENT)
Dept: ULTRASOUND IMAGING | Age: 24
Discharge: HOME | End: 2024-12-04
Attending: NURSE PRACTITIONER
Payer: COMMERCIAL

## 2024-12-04 DIAGNOSIS — N93.9 ABNORMAL UTERINE BLEEDING (AUB): ICD-10-CM

## 2024-12-04 PROCEDURE — 76856 US EXAM PELVIC COMPLETE: CPT | Performed by: NURSE PRACTITIONER

## 2024-12-04 PROCEDURE — 76830 TRANSVAGINAL US NON-OB: CPT | Performed by: NURSE PRACTITIONER

## 2025-02-14 ENCOUNTER — OFFICE VISIT (OUTPATIENT)
Dept: OBGYN CLINIC | Facility: CLINIC | Age: 25
End: 2025-02-14
Payer: COMMERCIAL

## 2025-02-14 VITALS
BODY MASS INDEX: 34.42 KG/M2 | DIASTOLIC BLOOD PRESSURE: 83 MMHG | HEIGHT: 59.5 IN | WEIGHT: 173 LBS | SYSTOLIC BLOOD PRESSURE: 140 MMHG

## 2025-02-14 DIAGNOSIS — N92.6 MISSED MENSES: Primary | ICD-10-CM

## 2025-02-14 DIAGNOSIS — D50.8 IRON DEFICIENCY ANEMIA SECONDARY TO INADEQUATE DIETARY IRON INTAKE: ICD-10-CM

## 2025-02-14 DIAGNOSIS — R03.0 ELEVATED BP WITHOUT DIAGNOSIS OF HYPERTENSION: ICD-10-CM

## 2025-02-14 LAB
CONTROL LINE PRESENT WITH A CLEAR BACKGROUND (YES/NO): YES YES/NO
PREGNANCY TEST, URINE: POSITIVE

## 2025-02-14 PROCEDURE — 3079F DIAST BP 80-89 MM HG: CPT | Performed by: STUDENT IN AN ORGANIZED HEALTH CARE EDUCATION/TRAINING PROGRAM

## 2025-02-14 PROCEDURE — 81025 URINE PREGNANCY TEST: CPT | Performed by: STUDENT IN AN ORGANIZED HEALTH CARE EDUCATION/TRAINING PROGRAM

## 2025-02-14 PROCEDURE — 3008F BODY MASS INDEX DOCD: CPT | Performed by: STUDENT IN AN ORGANIZED HEALTH CARE EDUCATION/TRAINING PROGRAM

## 2025-02-14 PROCEDURE — 3077F SYST BP >= 140 MM HG: CPT | Performed by: STUDENT IN AN ORGANIZED HEALTH CARE EDUCATION/TRAINING PROGRAM

## 2025-02-14 RX ORDER — CHOLECALCIFEROL (VITAMIN D3) 25 MCG
1 TABLET,CHEWABLE ORAL DAILY
COMMUNITY

## 2025-02-14 NOTE — PROGRESS NOTES
Lorri Pritchard is a 24 year old female who presents for missed menses.     Patient's last menstrual period was 2024 (exact date).   LMP - uncertain  Menses - regular but was having irregular bleeding; knows last menses was December    GYN:   No menses from 0115-4157 due to Nexplanon, then irregular for approx 1 year [not skipping, just coming at different times in the month]  Flow is/was regular, every 28-32 days and lasting 5-6 days, heaviest first half (4 soaked ultra tampons). Has some  dysmenorrhea (cramps, chills -  STD HSV --> no recent outbreaks  Abn pap? No  Pap 2024 NILM    Pregnancy was not planned.   Was using nothing for contraception at time of conception  Took nothing to conceive   Plan to keep/continue pregnancy? keep   Folic acid prior to conceiving? no  Currently taking PNV containing iron? Yes - started     Symptoms this pregnancy:   Vaginal bleeding during pregnancy? No  Pelvic cramping/pain? Minor   Abnormal vaginal discharge? No   Nausea? yes   Vomiting? 0 times per day  Constipation? no   Dysuria? no  Headaches? no   Mood no    Partner/Father of the baby   -Medical conditions? no  -FHx of genetic diseases or birth defects? none     PMH:   >Anemia on PO iron  > Genital HSV - no outbreaks since   > Elevated BP today; upon review now meets criteria for chronic HTN --> discuss MFM referral next visit    History of   -GDM? N  -GHTN/Pre-eclampsia? N   Fhx pre-eclampsia? N   - labor? N   -shoulder dystocia? N  -3rd of 4th degree laceration? N  -postpartum hemorrhage? N  -blood transfusion? N   Would accept blood transfusion? yes   -VTE? No         Wt Readings from Last 6 Encounters:   25 173 lb (78.5 kg)   24 168 lb (76.2 kg)   24 170 lb (77.1 kg)   21 185 lb 8 oz (84.1 kg)   21 180 lb (81.6 kg)   21 177 lb (80.3 kg)       Body mass index is 34.36 kg/m².     HGB (g/dL)   Date Value   2024 9.5 (L)     PLT (10(3)uL)   Date Value    2024 303.0          Review of Systems: negative except per HPI     PCP Idris Coy MD     OB:  OB History    Para Term  AB Living   2 0 0 0 1 0   SAB IAB Ectopic Multiple Live Births   1              # Outcome Date GA Lbr Mark/2nd Weight Sex Type Anes PTL Lv   2 Current            1 SAB 23 7w0d    SAB   FD       PMH:   Past Medical History:   Diagnosis Date    Anemia     Dr nguyen me on 24    Decorative tattoo         PSH:    Past Surgical History:   Procedure Laterality Date    D & c      Week of 23       MEDS:  Medications Ordered Prior to Encounter[1]    Allergies:    Allergies[2]    Immunizations:  Immunization History   Administered Date(s) Administered    Covid-19 Vaccine [email or web address] (J&J) 0.5ml 2021    DTAP 1998, 1998, 1998, 1998, 2001, 2002    DTP/HIB Combined 1999    FLULAVAL 6 months & older 0.5 ml Prefilled syringe (73772) 2020, 2021    FLUZONE 6 months and older PFS 0.5 ml (47515) 2020, 2022    Fluvirin, 3 Years & >, Im 2013, 10/06/2015, 10/13/2017    HEP A,Ped/Adol,(2 Dose) 2009, 10/06/2015    HEP B, Ped/Adol 1999    HEP B/HIB 1998    HPV (Gardasil) 2015    Hep B, Unspecified Formulation 1998, 1998, 1998    Hib, Unspecified Formulation 1999    Hpv Virus Vaccine 9 Daysi Im 10/06/2015, 2016, 2021    IPV 1998, 1998, 1998, 1999, 2002    Influenza 2009, 2010    Intranasal (CPT=90660), Influenza Vaccine, Flu [facility] 2011, 2012    MMR 1999, 1999, 2002    Meningococcal-Menactra 10/06/2015, 07/15/2016    OPV 1999    TDAP 2012, 2024    Varicella 1999, 2002, 2002       Family Hx:   Family History   Problem Relation Age of Onset    Hypertension Mother     Diabetes Maternal Grandfather        SocialHx:    Social History     Socioeconomic  History    Marital status: Single   Tobacco Use    Smoking status: Never     Passive exposure: Never    Smokeless tobacco: Never   Vaping Use    Vaping status: Never Used   Substance and Sexual Activity    Alcohol use: Not Currently    Drug use: Never    Sexual activity: Yes     Partners: Male         REVIEW OF SYSTEMS:   GENERAL: feels well otherwise  SKIN: denies any unusual skin lesions  EYES:denies blurred vision or double vision  HEENT: denies nasal congestion, sinus pain or ST  LUNGS: denies shortness of breath with exertion  CARDIOVASCULAR: denies chest pain on exertion  GI: denies abdominal pain,denies heartburn  : denies dysuria, vaginal discharge or itching,periods regular   MUSCULOSKELETAL: denies back pain  NEURO: denies headaches  PSYCHE: denies depression or anxiety  HEMATOLOGIC: denies hx of anemia  ENDOCRINE: denies thyroid history  ALL/ASTHMA: denies hx of allergy or asthma    EXAM:       Physical Exam  HENT:      Head: Normocephalic and atraumatic.   Eyes:      Extraocular Movements: Extraocular movements intact.   Pulmonary:      Effort: Pulmonary effort is normal.   Abdominal:      Palpations: Abdomen is soft.   Musculoskeletal:      Cervical back: Normal range of motion.   Skin:     General: Skin is warm and dry.   Neurological:      General: No focal deficit present.      Mental Status: She is alert. Mental status is at baseline.   Psychiatric:         Behavior: Behavior normal.         Thought Content: Thought content normal.           Ultrasound: Transvaginal US performed with retroverted uterus and intrauterine gestational sac with yolk sac visible measuring 0.37 cm. Endometrial stripe measuring 1.32 cm. No fetal pole. Questionable subchorionic hematoma. Left adnexa with known large cyst. Right adnexa not well visualized.     ASSESSMENT AND PLAN:   Lorri Pritchard is a 24 year old female who presents for a     1) amenorrhea/missed menses/pcv:  Pt was counseled extensively on pregnancy  care including diet in pregnancy/medications in pregnancy/weight gain in pregnancy/travel precautions in pregnancy/zika and covid 19 precautions in pregnancy/ genetic testing in pregnancy/ dietary restrictions in pregnancy/among others and the pt voiced her understanding and all questions answered. Positive ucg noted today, pt counseled.     NOB labs ordered + early GTT screening and baseline cHTN labs. US ordered for 2 weeks for viability. Needs MFM referral next visit.          [1]   Current Outpatient Medications on File Prior to Visit   Medication Sig Dispense Refill    prenatal vitamin with DHA 27-0.8-228 MG Oral Cap Take 1 capsule by mouth daily.      Ferrous Sulfate 325 (65 Fe) MG Oral Tab Take 1 tablet (325 mg total) by mouth in the morning and 1 tablet (325 mg total) before bedtime. With orange juice.. 180 tablet 1    valACYclovir 1 G Oral Tab Take 1 tablet (1,000 mg total) by mouth every 12 (twelve) hours. (Patient not taking: Reported on 2/14/2025) 30 tablet 1    naproxen 500 MG Oral Tab Take 1 tablet (500 mg total) by mouth 2 (two) times daily with meals. (Patient not taking: Reported on 2/14/2025) 60 tablet 0     No current facility-administered medications on file prior to visit.   [2] No Known Allergies

## 2025-02-17 LAB
C TRACH DNA SPEC QL NAA+PROBE: NEGATIVE
N GONORRHOEA DNA SPEC QL NAA+PROBE: NEGATIVE
T VAGINALIS RRNA SPEC QL NAA+PROBE: NEGATIVE

## 2025-02-19 ENCOUNTER — NURSE ONLY (OUTPATIENT)
Dept: OBGYN CLINIC | Facility: CLINIC | Age: 25
End: 2025-02-19

## 2025-02-19 ENCOUNTER — TELEPHONE (OUTPATIENT)
Dept: OBGYN CLINIC | Facility: CLINIC | Age: 25
End: 2025-02-19

## 2025-02-19 NOTE — TELEPHONE ENCOUNTER
Pt arrived at office for RN OB Education. This is a phone visit, and no office space available at this time for in-person visit. Pt to call office when she is ready to initiate the visit via phone. Call to be transferred to [facility] RN.

## 2025-02-19 NOTE — PROGRESS NOTES
Pt called today for RN OB Education.   LMP: 24 (approximate)    Pre  BMI: 33.38    EPDS score: 0/30    Working CRYS: 25  Pt to complete 1st trimester US for dating and viability.  Hx of genetic abnormality in family: denies     Consent (if needed): n/a    Sterilization/Contraception: none    OUD Screening: Pt. Has answered NO 5P questions and has NO  risk factors.    SDOH Screening: low risk    Educational material reviewed with patient: Prenatal care, nutrition, weight gain recommendations, travel, exercise, intercourse, pregnancy changes, safe medications, pregnancy and work, fetal movement, labor and  labor, warning signs, food safety, tdap, cord blood, breastfeeding, circumcision, and Group B strep.       PN labs: ordered by Dr. Pearl    Iron Supplementation (325 mg every other day): advised  Vitamin D (2,000 IUs daily): advised  Calcium (1 gram Daily): advised    Optional genetic screening labs were reviewed: Cell FreeDNA, FTS with US, Quad screen MSAFP and CF screening. Pt requests [place] Screening. Pt will  kit in office. Kit prepared for pickup.    [facility] [place] [employer] Policy: reviewed    NOB apt:  3/12/25 Dr. Robert    Disclaimer: The calendars that will be provided at your appointment, are a practice guideline and can change based on the individual.

## 2025-03-03 ENCOUNTER — HOSPITAL ENCOUNTER (OUTPATIENT)
Dept: ULTRASOUND IMAGING | Facility: HOSPITAL | Age: 25
Discharge: HOME | End: 2025-03-03
Attending: STUDENT IN AN ORGANIZED HEALTH CARE EDUCATION/TRAINING PROGRAM
Payer: COMMERCIAL

## 2025-03-03 DIAGNOSIS — N92.6 MISSED MENSES: ICD-10-CM

## 2025-03-03 PROCEDURE — 76801 OB US < 14 WKS SINGLE FETUS: CPT | Performed by: STUDENT IN AN ORGANIZED HEALTH CARE EDUCATION/TRAINING PROGRAM

## 2025-03-04 ENCOUNTER — TELEPHONE (OUTPATIENT)
Dept: OBGYN CLINIC | Facility: CLINIC | Age: 25
End: 2025-03-04

## 2025-03-04 ENCOUNTER — LAB ENCOUNTER (OUTPATIENT)
Dept: LAB | Age: 25
End: 2025-03-04
Attending: STUDENT IN AN ORGANIZED HEALTH CARE EDUCATION/TRAINING PROGRAM
Payer: COMMERCIAL

## 2025-03-04 ENCOUNTER — ROUTINE PRENATAL (OUTPATIENT)
Dept: OBGYN CLINIC | Facility: CLINIC | Age: 25
End: 2025-03-04
Payer: COMMERCIAL

## 2025-03-04 VITALS — SYSTOLIC BLOOD PRESSURE: 123 MMHG | WEIGHT: 177 LBS | BODY MASS INDEX: 35 KG/M2 | DIASTOLIC BLOOD PRESSURE: 78 MMHG

## 2025-03-04 DIAGNOSIS — Z34.81 ENCOUNTER FOR SUPERVISION OF OTHER NORMAL PREGNANCY IN FIRST TRIMESTER (HCC): Primary | ICD-10-CM

## 2025-03-04 DIAGNOSIS — R03.0 ELEVATED BP WITHOUT DIAGNOSIS OF HYPERTENSION: ICD-10-CM

## 2025-03-04 DIAGNOSIS — Z34.81 ENCOUNTER FOR SUPERVISION OF OTHER NORMAL PREGNANCY IN FIRST TRIMESTER (HCC): ICD-10-CM

## 2025-03-04 DIAGNOSIS — N92.6 MISSED MENSES: ICD-10-CM

## 2025-03-04 DIAGNOSIS — D50.8 IRON DEFICIENCY ANEMIA SECONDARY TO INADEQUATE DIETARY IRON INTAKE: ICD-10-CM

## 2025-03-04 LAB
ALBUMIN SERPL-MCNC: 4.5 G/DL (ref 3.2–4.8)
ALBUMIN/GLOB SERPL: 1.7 {RATIO} (ref 1–2)
ALP LIVER SERPL-CCNC: 54 U/L
ALT SERPL-CCNC: 20 U/L
ANION GAP SERPL CALC-SCNC: 11 MMOL/L (ref 0–18)
ANTIBODY SCREEN: NEGATIVE
AST SERPL-CCNC: 17 U/L (ref ?–34)
B-HCG SERPL-ACNC: ABNORMAL MIU/ML
BILIRUB SERPL-MCNC: 0.4 MG/DL (ref 0.3–1.2)
BILIRUB UR QL: NEGATIVE
BUN BLD-MCNC: 9 MG/DL (ref 9–23)
BUN/CREAT SERPL: 14.5 (ref 10–20)
CALCIUM BLD-MCNC: 9.3 MG/DL (ref 8.7–10.4)
CHLORIDE SERPL-SCNC: 102 MMOL/L (ref 98–112)
CLARITY UR: CLEAR
CO2 SERPL-SCNC: 24 MMOL/L (ref 21–32)
COLOR UR: COLORLESS
CREAT BLD-MCNC: 0.62 MG/DL
CREAT UR-SCNC: 27.7 MG/DL
DEPRECATED HBV CORE AB SER IA-ACNC: 6 NG/ML
DEPRECATED RDW RBC AUTO: 48.3 FL (ref 35.1–46.3)
EGFRCR SERPLBLD CKD-EPI 2021: 126 ML/MIN/1.73M2 (ref 60–?)
ERYTHROCYTE [DISTWIDTH] IN BLOOD BY AUTOMATED COUNT: 19 % (ref 11–15)
EST. AVERAGE GLUCOSE BLD GHB EST-MCNC: 111 MG/DL (ref 68–126)
FASTING STATUS PATIENT QL REPORTED: NO
GLOBULIN PLAS-MCNC: 2.6 G/DL (ref 2–3.5)
GLUCOSE BLD-MCNC: 91 MG/DL (ref 70–99)
GLUCOSE UR-MCNC: NORMAL MG/DL
HBA1C MFR BLD: 5.5 % (ref ?–5.7)
HBV SURFACE AG SER-ACNC: 0.1 [IU]/L
HBV SURFACE AG SERPL QL IA: NONREACTIVE
HCT VFR BLD AUTO: 33.3 %
HGB BLD-MCNC: 10.1 G/DL
HGB UR QL STRIP.AUTO: NEGATIVE
KETONES UR-MCNC: NEGATIVE MG/DL
LEUKOCYTE ESTERASE UR QL STRIP.AUTO: NEGATIVE
MCH RBC QN AUTO: 21.8 PG (ref 26–34)
MCHC RBC AUTO-ENTMCNC: 30.3 G/DL (ref 31–37)
MCV RBC AUTO: 71.9 FL
NITRITE UR QL STRIP.AUTO: NEGATIVE
OSMOLALITY SERPL CALC.SUM OF ELEC: 282 MOSM/KG (ref 275–295)
PH UR: 5.5 [PH] (ref 5–8)
PLATELET # BLD AUTO: 301 10(3)UL (ref 150–450)
POTASSIUM SERPL-SCNC: 3.5 MMOL/L (ref 3.5–5.1)
PROGEST SERPL-MCNC: 13.87 NG/ML
PROT SERPL-MCNC: 7.1 G/DL (ref 5.7–8.2)
PROT UR-MCNC: <6 MG/DL (ref ?–14)
PROT UR-MCNC: NEGATIVE MG/DL
RBC # BLD AUTO: 4.63 X10(6)UL
RH BLOOD TYPE: POSITIVE
RUBV IGG SER QL: POSITIVE
RUBV IGG SER-ACNC: 58 IU/ML (ref 10–?)
SODIUM SERPL-SCNC: 137 MMOL/L (ref 136–145)
SP GR UR STRIP: 1.01 (ref 1–1.03)
T PALLIDUM AB SER QL IA: NONREACTIVE
UROBILINOGEN UR STRIP-ACNC: NORMAL
WBC # BLD AUTO: 11.8 X10(3) UL (ref 4–11)

## 2025-03-04 PROCEDURE — 3078F DIAST BP <80 MM HG: CPT | Performed by: OBSTETRICS & GYNECOLOGY

## 2025-03-04 PROCEDURE — 86900 BLOOD TYPING SEROLOGIC ABO: CPT | Performed by: STUDENT IN AN ORGANIZED HEALTH CARE EDUCATION/TRAINING PROGRAM

## 2025-03-04 PROCEDURE — 81003 URINALYSIS AUTO W/O SCOPE: CPT

## 2025-03-04 PROCEDURE — 85027 COMPLETE CBC AUTOMATED: CPT

## 2025-03-04 PROCEDURE — 82728 ASSAY OF FERRITIN: CPT

## 2025-03-04 PROCEDURE — 86901 BLOOD TYPING SEROLOGIC RH(D): CPT | Performed by: STUDENT IN AN ORGANIZED HEALTH CARE EDUCATION/TRAINING PROGRAM

## 2025-03-04 PROCEDURE — 36415 COLL VENOUS BLD VENIPUNCTURE: CPT | Performed by: STUDENT IN AN ORGANIZED HEALTH CARE EDUCATION/TRAINING PROGRAM

## 2025-03-04 PROCEDURE — 87340 HEPATITIS B SURFACE AG IA: CPT

## 2025-03-04 PROCEDURE — 83036 HEMOGLOBIN GLYCOSYLATED A1C: CPT

## 2025-03-04 PROCEDURE — 84702 CHORIONIC GONADOTROPIN TEST: CPT

## 2025-03-04 PROCEDURE — 99284 EMERGENCY DEPT VISIT MOD MDM: CPT

## 2025-03-04 PROCEDURE — 86787 VARICELLA-ZOSTER ANTIBODY: CPT

## 2025-03-04 PROCEDURE — 83020 HEMOGLOBIN ELECTROPHORESIS: CPT

## 2025-03-04 PROCEDURE — 80053 COMPREHEN METABOLIC PANEL: CPT

## 2025-03-04 PROCEDURE — 81329 SMN1 GENE DOS/DELETION ALYS: CPT

## 2025-03-04 PROCEDURE — 86850 RBC ANTIBODY SCREEN: CPT | Performed by: STUDENT IN AN ORGANIZED HEALTH CARE EDUCATION/TRAINING PROGRAM

## 2025-03-04 PROCEDURE — 82570 ASSAY OF URINE CREATININE: CPT

## 2025-03-04 PROCEDURE — 86762 RUBELLA ANTIBODY: CPT

## 2025-03-04 PROCEDURE — 84156 ASSAY OF PROTEIN URINE: CPT

## 2025-03-04 PROCEDURE — 87389 HIV-1 AG W/HIV-1&-2 AB AG IA: CPT

## 2025-03-04 PROCEDURE — 84144 ASSAY OF PROGESTERONE: CPT

## 2025-03-04 PROCEDURE — 86780 TREPONEMA PALLIDUM: CPT

## 2025-03-04 PROCEDURE — 83021 HEMOGLOBIN CHROMOTOGRAPHY: CPT

## 2025-03-04 PROCEDURE — 3074F SYST BP LT 130 MM HG: CPT | Performed by: OBSTETRICS & GYNECOLOGY

## 2025-03-04 NOTE — TELEPHONE ENCOUNTER
Pt name and  verified     Pt states she noticed spotting this morning when wiping. Pt denies saturating a pad within an hour. Pt denies cramping. Pt denies burning with urination, increased urinary frequency/urgency. Pt denies any recent intercourse. Pt scheduled today for evaluation with Dr. Robert. Pt aware of scheduling details. Bleeding precautions advised.

## 2025-03-04 NOTE — PROGRESS NOTES
Lorri Pritchard is a 24 year old female  Patient's last menstrual period was 2024 (approximate). No chief complaint on file.      OBSTETRICS HISTORY:     OB History    Para Term  AB Living   2 0 0 0 1 0   SAB IAB Ectopic Multiple Live Births   1              # Outcome Date GA Lbr Mark/2nd Weight Sex Type Anes PTL Lv   2 Current            1 SAB 23 7w0d    SAB   FD       GYNE HISTORY:         Menarche: 9y/o (2025  9:20 AM)  Period Cycle (Days): Irregular (2025  9:20 AM)  Period Duration (Days): 5-7days (2025  9:20 AM)  Period Flow: Heavy to light (2025  9:20 AM)  Use of Birth Control (if yes, specify type): None (2025  9:20 AM)  Hx Prior Abnormal Pap: No (2025  9:20 AM)  Pap Date: 24 (2025  9:20 AM)  Pap Result Notes: normal (2025  9:20 AM)        Latest Ref Rng & Units 2024    11:45 AM 2021     3:32 PM   RECENT PAP RESULTS   INTERPRETATION/RESULT: Negative for intraepithelial lesion or malignancy Negative for intraepithelial lesion or malignancy  Negative for intraepithelial lesion or malignancy    HPV Negative  Negative          MEDICAL HISTORY:     Past Medical History:    Anemia    Dr nguyen me on 24    Decorative tattoo       SURGICAL HISTORY:     Past Surgical History:   Procedure Laterality Date    D & c      Week of 23       SOCIAL HISTORY:     Social History     Socioeconomic History    Marital status: Single   Tobacco Use    Smoking status: Never     Passive exposure: Never    Smokeless tobacco: Never   Vaping Use    Vaping status: Never Used   Substance and Sexual Activity    Alcohol use: Not Currently    Drug use: Never    Sexual activity: Yes     Partners: Male     Social Drivers of Health     Food Insecurity: No Food Insecurity (2025)    NCSS - Food Insecurity     Worried About Running Out of Food in the Last Year: No     Ran Out of Food in the Last Year: No   Transportation Needs: No  Transportation Needs (2/19/2025)    NCSS - Transportation     Lack of Transportation: No   Stress: No Stress Concern Present (2/19/2025)    Stress     Feeling of Stress : No   Housing Stability: Not At Risk (2/19/2025)    NCSS - Housing/Utilities     Has Housing: Yes     Worried About Losing Housing: No     Unable to Get Utilities: No   Recent Concern: Housing Stability - At Risk (2/19/2025)    NCSS - Housing/Utilities     Has Housing: No     Worried About Losing Housing: No     Unable to Get Utilities: No        FAMILY HISTORY:     Family History   Problem Relation Age of Onset    Hypertension Mother     Diabetes Maternal Grandfather        MEDICATIONS:       Current Outpatient Medications:     prenatal vitamin with DHA 27-0.8-228 MG Oral Cap, Take 1 capsule by mouth daily., Disp: , Rfl:     Ferrous Sulfate 325 (65 Fe) MG Oral Tab, Take 1 tablet (325 mg total) by mouth in the morning and 1 tablet (325 mg total) before bedtime. With orange juice.. (Patient not taking: Reported on 2/19/2025), Disp: 180 tablet, Rfl: 1    valACYclovir 1 G Oral Tab, Take 1 tablet (1,000 mg total) by mouth every 12 (twelve) hours. (Patient not taking: Reported on 11/14/2024), Disp: 30 tablet, Rfl: 1    naproxen 500 MG Oral Tab, Take 1 tablet (500 mg total) by mouth 2 (two) times daily with meals. (Patient not taking: Reported on 9/22/2021), Disp: 60 tablet, Rfl: 0    ALLERGIES:     Allergies[1]      REVIEW OF SYSTEMS:     Constitutional:    denies fever / chills  Cardiovascular:  denies chest pain or palpitations  Respiratory:    denies shortness of breath  Gastrointestinal:  denies severe abdominal pain, frequent diarrhea or constipation, nausea / vomiting  Genitourinary:    denies dysuria, bothersome incontinence  Skin/Breast:   denies any breast pain, lumps, or discharge  Neurological:    denies frequent severe headaches  Psychiatric:   denies depression or anxiety, thoughts of harming self or others      PHYSICAL EXAM:   Last  menstrual period 12/20/2024.  Constitutional:  well developed, well nourished, no distress  Abdomen:   soft, gravid, nontender  Musculoskeletal: no cva tenderness bilaterally  Skin/Hair:  no unusual rashes or bruises  Extremities:  no edema, no cyanosis, non tender bilaterally  Psychiatric:   oriented to time, place, person and situation. Appropriate mood and affect    Ultraasound done at [facility] yesterday results pending ,   review of films show fhts 167 and siup.      ASSESSMENT & PLAN:     Diagnoses and all orders for this visit:    Encounter for supervision of other normal pregnancy in first trimester (HCC)          FOLLOW-UP     No follow-ups on file.      Ajith Robert MD  3/4/2025         [1] No Known Allergies

## 2025-03-05 ENCOUNTER — HOSPITAL ENCOUNTER (EMERGENCY)
Facility: HOSPITAL | Age: 25
Discharge: HOME | End: 2025-03-05
Attending: EMERGENCY MEDICINE
Payer: COMMERCIAL

## 2025-03-05 VITALS
WEIGHT: 177 LBS | TEMPERATURE: 98 F | DIASTOLIC BLOOD PRESSURE: 74 MMHG | HEART RATE: 85 BPM | OXYGEN SATURATION: 99 % | HEIGHT: 59 IN | RESPIRATION RATE: 18 BRPM | SYSTOLIC BLOOD PRESSURE: 133 MMHG | BODY MASS INDEX: 35.68 KG/M2

## 2025-03-05 DIAGNOSIS — O46.8X1 SUBCHORIONIC HEMATOMA IN FIRST TRIMESTER, SINGLE OR UNSPECIFIED FETUS (HCC): ICD-10-CM

## 2025-03-05 DIAGNOSIS — O20.0 THREATENED MISCARRIAGE IN EARLY PREGNANCY (HCC): Primary | ICD-10-CM

## 2025-03-05 DIAGNOSIS — O41.8X10 SUBCHORIONIC HEMATOMA IN FIRST TRIMESTER, SINGLE OR UNSPECIFIED FETUS (HCC): ICD-10-CM

## 2025-03-05 LAB
HGB A2 MFR BLD: 2.3 % (ref 1.5–3.5)
HGB PNL BLD ELPH: 97.7 % (ref 95.5–100)
VZV IGG SER IA-ACNC: 0.86 (ref 1–?)

## 2025-03-05 NOTE — ED INITIAL ASSESSMENT (HPI)
PT states she is appx 8 weeks pregnant was having vaginal spotting which has progressed to heavy bleeding over the last few hours.

## 2025-03-05 NOTE — ED PROVIDER NOTES
Patient Seen in: [facility] Emergency Department      History     Chief Complaint   Patient presents with    Pregnancy Issues     Stated Complaint: 8 wks pregnant, bleeding/clots, cramping    Subjective:   HPI          Objective:     Past Medical History:    Anemia     messaged me on 11/12/24    Decorative tattoo              Past Surgical History:   Procedure Laterality Date    D & c      Week of 7/21/23                Social History     Socioeconomic History    Marital status: Single   Tobacco Use    Smoking status: Never     Passive exposure: Never    Smokeless tobacco: Never   Vaping Use    Vaping status: Never Used   Substance and Sexual Activity    Alcohol use: Not Currently    Drug use: Never    Sexual activity: Yes     Partners: Male     Social Drivers of Health     Food Insecurity: No Food Insecurity (2/19/2025)    NCSS - Food Insecurity     Worried About Running Out of Food in the Last Year: No     Ran Out of Food in the Last Year: No   Transportation Needs: No Transportation Needs (2/19/2025)    NCSS - Transportation     Lack of Transportation: No   Stress: No Stress Concern Present (2/19/2025)    Stress     Feeling of Stress : No   Housing Stability: Not At Risk (2/19/2025)    NCSS - Housing/Utilities     Has Housing: Yes     Worried About Losing Housing: No     Unable to Get Utilities: No   Recent Concern: Housing Stability - At Risk (2/19/2025)    NCSS - Housing/Utilities     Has Housing: No     Worried About Losing Housing: No     Unable to Get Utilities: No                  Physical Exam     ED Triage Vitals [03/05/25 0004]   /74   Pulse 85   Resp 18   Temp 98.2 °F (36.8 °C)   Temp src Temporal   SpO2 99 %   O2 Device None (Room air)       Current Vitals:   Vital Signs  BP: 133/74  Pulse: 85  Resp: 18  Temp: 98.2 °F (36.8 °C)  Temp src: Temporal    Oxygen Therapy  SpO2: 99 %  O2 Device: None (Room air)        Physical Exam        ED Course   Labs Reviewed - No data to display                 MDM      24-year-old female 8 weeks pregnant per ultrasound performed yesterday presents with vaginal bleeding.  Patient is G2, P0 with 1 previous miscarriage.  She states that this afternoon she started having some light vaginal bleeding which initially resolved but then recurred later in the evening.  Denies large clots, abdominal cramping/pain, or other symptoms.     review of the patient's initial labs and ultrasound from her last OB/GYN appointment.  Patient blood type O+.  Ultrasound showed a live intrauterine pregnancy at 8 weeks gestation with positive fetal heart rate with a notable subchorionic hemorrhage.    On exam, vitals normal, well-appearing, abdomen benign, no active bleeding per patient    Differential: Subchorionic hematoma with bleeding, threatened     I performed and interpreted bedside transabdominal OB ultrasound.  This showed a live intrauterine pregnancy with fetal heart rate 157 measuring at stated dates.  Subchorionic hematoma redemonstrated.    Patient was advised on the findings.  She has an OB appointment already set up for next week.  She was given careful return precautions.  No other workup necessary in the ED.        MDM    Disposition and Plan     Clinical Impression:  1. Threatened miscarriage in early pregnancy (HCC)    2. Subchorionic hematoma in first trimester, single or unspecified fetus (HCC)         Disposition:  Discharge  3/5/2025 12:49 am    Follow-up:  Your OBGYN    Follow up  Next week          Medications Prescribed:  Discharge Medication List as of 3/5/2025  1:01 AM              Supplementary Documentation:

## 2025-03-05 NOTE — ED NOTES
Bedside ultrasound done by ED MD.  Pt cleared for discharge by ED MD.    RN reviewed discharge instructions with pt.  Key points highlighted, RN emphasized home care for bleeding during pregnancy.  Pt already has appt scheduled with OB/GYN for March 12.  No questions at this time.    Pt dressed self.  Ambulatory to lobby with steady gait.  Left in good condition.

## 2025-03-09 RX ORDER — PROGESTERONE 100 MG/1
100 CAPSULE ORAL 2 TIMES DAILY
Qty: 60 CAPSULE | Refills: 1 | Status: SHIPPED | OUTPATIENT
Start: 2025-03-09

## 2025-03-12 ENCOUNTER — INITIAL PRENATAL (OUTPATIENT)
Dept: OBGYN CLINIC | Facility: CLINIC | Age: 25
End: 2025-03-12

## 2025-03-12 VITALS — DIASTOLIC BLOOD PRESSURE: 88 MMHG | BODY MASS INDEX: 35 KG/M2 | WEIGHT: 175.19 LBS | SYSTOLIC BLOOD PRESSURE: 133 MMHG

## 2025-03-12 DIAGNOSIS — Z34.81 ENCOUNTER FOR SUPERVISION OF OTHER NORMAL PREGNANCY IN FIRST TRIMESTER (HCC): Primary | ICD-10-CM

## 2025-03-12 PROBLEM — D27.9 DERMOID CYST OF OVARY: Status: ACTIVE | Noted: 2025-03-12

## 2025-03-12 PROCEDURE — 3079F DIAST BP 80-89 MM HG: CPT | Performed by: OBSTETRICS & GYNECOLOGY

## 2025-03-12 PROCEDURE — 3075F SYST BP GE 130 - 139MM HG: CPT | Performed by: OBSTETRICS & GYNECOLOGY

## 2025-03-12 NOTE — PROGRESS NOTES
Lorri Pritchard is a 24 year old female  Patient's last menstrual period was 2024 (approximate).   Chief Complaint   Patient presents with    Prenatal Care     New OB       OBSTETRICS HISTORY:     OB History    Para Term  AB Living   2 0 0 0 1 0   SAB IAB Ectopic Multiple Live Births   1              # Outcome Date GA Lbr Mark/2nd Weight Sex Type Anes PTL Lv   2 Current            1 SAB 23 7w0d    SAB   FD       GYNE HISTORY:         Menarche: 7y/o (2025  9:20 AM)  Period Cycle (Days): Irregular (2025  9:20 AM)  Period Duration (Days): 5-7days (2025  9:20 AM)  Period Flow: Heavy to light (2025  9:20 AM)  Use of Birth Control (if yes, specify type): None (2025  9:20 AM)  Hx Prior Abnormal Pap: No (2025  9:20 AM)  Pap Date: 24 (2025  9:20 AM)  Pap Result Notes: normal (2025  9:20 AM)        Latest Ref Rng & Units 2024    11:45 AM 2021     3:32 PM   RECENT PAP RESULTS   INTERPRETATION/RESULT: Negative for intraepithelial lesion or malignancy Negative for intraepithelial lesion or malignancy  Negative for intraepithelial lesion or malignancy    HPV Negative  Negative          MEDICAL HISTORY:     Past Medical History:    Anemia    Dr nguyen me on 24    Decorative tattoo       SURGICAL HISTORY:     Past Surgical History:   Procedure Laterality Date    D & c      Week of 23       SOCIAL HISTORY:     Social History     Socioeconomic History    Marital status: Single   Tobacco Use    Smoking status: Never     Passive exposure: Never    Smokeless tobacco: Never   Vaping Use    Vaping status: Never Used   Substance and Sexual Activity    Alcohol use: Not Currently    Drug use: Never    Sexual activity: Yes     Partners: Male     Social Drivers of Health     Food Insecurity: No Food Insecurity (3/12/2025)    NCSS - Food Insecurity     Worried About Running Out of Food in the Last Year: No     Ran Out of Food in the Last  Year: No   Transportation Needs: No Transportation Needs (3/12/2025)    NCSS - Transportation     Lack of Transportation: No   Stress: No Stress Concern Present (3/12/2025)    Stress     Feeling of Stress : No   Housing Stability: Not At Risk (3/12/2025)    NCSS - Housing/Utilities     Has Housing: Yes     Worried About Losing Housing: No     Unable to Get Utilities: No   Recent Concern: Housing Stability - At Risk (2/19/2025)    NCSS - Housing/Utilities     Has Housing: No     Worried About Losing Housing: No     Unable to Get Utilities: No        FAMILY HISTORY:     Family History   Problem Relation Age of Onset    Hypertension Mother     Diabetes Maternal Grandfather        MEDICATIONS:       Current Outpatient Medications:     progesterone 100 MG Oral Cap, Place 1 capsule (100 mg total) vaginally 2 (two) times daily., Disp: 60 capsule, Rfl: 1    prenatal vitamin with DHA 27-0.8-228 MG Oral Cap, Take 1 capsule by mouth daily., Disp: , Rfl:     Ferrous Sulfate 325 (65 Fe) MG Oral Tab, Take 1 tablet (325 mg total) by mouth in the morning and 1 tablet (325 mg total) before bedtime. With orange juice.. (Patient not taking: Reported on 2/19/2025), Disp: 180 tablet, Rfl: 1    valACYclovir 1 G Oral Tab, Take 1 tablet (1,000 mg total) by mouth every 12 (twelve) hours. (Patient not taking: Reported on 11/14/2024), Disp: 30 tablet, Rfl: 1    naproxen 500 MG Oral Tab, Take 1 tablet (500 mg total) by mouth 2 (two) times daily with meals. (Patient not taking: Reported on 9/22/2021), Disp: 60 tablet, Rfl: 0    ALLERGIES:     Allergies[1]      REVIEW OF SYSTEMS:     Constitutional:    denies fever / chills  Cardiovascular:  denies chest pain or palpitations  Respiratory:    denies shortness of breath  Gastrointestinal:  denies severe abdominal pain, frequent diarrhea or constipation, nausea / vomiting  Genitourinary:    denies dysuria, bothersome incontinence  Skin/Breast:   denies any breast pain, lumps, or  discharge  Neurological:    denies frequent severe headaches  Psychiatric:   denies depression or anxiety, thoughts of harming self or others      PHYSICAL EXAM:   Blood pressure 133/88, weight 175 lb 3.2 oz (79.5 kg), last menstrual period 12/20/2024.  Constitutional:  well developed, well nourished, no distress  Abdomen:   soft, gravid, nontender  Musculoskeletal: no cva tenderness bilaterally  Skin/Hair:  no unusual rashes or bruises  Extremities:  no edema, no cyanosis, non tender bilaterally  Psychiatric:   oriented to time, place, person and situation. Appropriate mood and affect      ASSESSMENT & PLAN:           FOLLOW-UP     No follow-ups on file.      Ajith Robert MD  3/12/2025         [1] No Known Allergies

## 2025-03-26 ENCOUNTER — TELEPHONE (OUTPATIENT)
Dept: OBGYN CLINIC | Facility: CLINIC | Age: 25
End: 2025-03-26

## 2025-03-26 NOTE — TELEPHONE ENCOUNTER
Family Medical Leave Act and short term disability forms received via [employer]. Two [employer] messages sent for Release of Information's. Logged for processing.

## 2025-03-31 NOTE — TELEPHONE ENCOUNTER
Patient calling back to provide details for Family Medical Leave Act.     Type of Leave: Continuous Family Medical Leave Act   Reason for Leave: Pregnancy   Start date of leave: 10/12/25  End date of leave: 12 weeks   How many flare ups per month/length?:  How many appts per month/length?:   Was Fee and Turnaround info Given?:

## 2025-03-31 NOTE — TELEPHONE ENCOUNTER
2nd attempt [employer] message sent to patient. Details needed.     Type of Leave: Continuous Family Medical Leave Act   Reason for Leave: Pregnancy  Start date of leave:  End date of leave:  How many flare ups per month/length?:  How many appts per month/length?:   Was Fee and Turnaround info Given?:

## 2025-04-01 NOTE — TELEPHONE ENCOUNTER
Hi Dr. Pearl,     Please sign off on 2 forms if you agree to:   Family Medical Leave Act & disability due to maternity, start on or around 10/12/25 - 6wks vaginal/8wks .  -Signature page will be the first page scanned  -From your Inbasket, Highlight the patient and click Chart   -Double click the 25 Forms Completion telephone encounter  -Scroll down to the Media section   -Click the blue Hyperlink: Dr.Pettit Crystal, 25, Dr.Pettit Riky, 25  -Click Acknowledge located in the top right ribbon/menu   -Drag the mouse into the blank space of the document and a + sign will appear. Left click to   electronically sign the document.  -Once signed, simply exit out of the screen and you signature will be saved.     Thank you,   Ana Luisa  Forms Dept

## 2025-04-02 NOTE — TELEPHONE ENCOUNTER
Forms completed.  Family Medical Leave Act e-faxed to [employer] [phone]; awaiting confirmation. Confirmation received.  Disability form unable to fax as patient does not have a valid BCBS authorization. [employer] message sent to patient.

## 2025-04-09 ENCOUNTER — HOSPITAL ENCOUNTER (OUTPATIENT)
Dept: PERINATAL CARE | Facility: HOSPITAL | Age: 25
Discharge: HOME | End: 2025-04-09
Attending: OBSTETRICS & GYNECOLOGY
Payer: COMMERCIAL

## 2025-04-09 VITALS
HEART RATE: 84 BPM | DIASTOLIC BLOOD PRESSURE: 71 MMHG | SYSTOLIC BLOOD PRESSURE: 110 MMHG | WEIGHT: 179 LBS | BODY MASS INDEX: 36 KG/M2

## 2025-04-09 DIAGNOSIS — E66.09 OTHER OBESITY DUE TO EXCESS CALORIES AFFECTING PREGNANCY IN FIRST TRIMESTER (HCC): ICD-10-CM

## 2025-04-09 DIAGNOSIS — R03.0 ELEVATED BP WITHOUT DIAGNOSIS OF HYPERTENSION: ICD-10-CM

## 2025-04-09 DIAGNOSIS — Z36.9 FIRST TRIMESTER SCREENING (HCC): Primary | ICD-10-CM

## 2025-04-09 DIAGNOSIS — O99.211 OTHER OBESITY DUE TO EXCESS CALORIES AFFECTING PREGNANCY IN FIRST TRIMESTER (HCC): ICD-10-CM

## 2025-04-09 DIAGNOSIS — Z34.81 PRENATAL CARE, SUBSEQUENT PREGNANCY, FIRST TRIMESTER (HCC): ICD-10-CM

## 2025-04-09 DIAGNOSIS — Z36.9 FIRST TRIMESTER SCREENING (HCC): ICD-10-CM

## 2025-04-09 PROCEDURE — 76813 OB US NUCHAL MEAS 1 GEST: CPT | Performed by: OBSTETRICS & GYNECOLOGY

## 2025-04-09 NOTE — PROGRESS NOTES
Reason for Consult:   Dear Dr. Robert,    Thank you for requesting ultrasound evaluation and maternal fetal medicine consultation on Lorri Pritchard.  As you are aware she is a 24 year old female with a Giv[place] pregnancy .  A maternal-fetal medicine consultation was requested secondary to  obesity and First Trimester screen.  Her prenatal records and labs were reviewed.    Review of History:     OB History:    OB History    Para Term  AB Living   2 0 0 0 1 0   SAB IAB Ectopic Multiple Live Births   1 0 0 0 0      # Outcome Date GA Lbr Mark/2nd Weight Sex Type Anes PTL Lv   2 Current            1 SAB 23 7w0d    SAB   FD             Allergies:  Allergies[1]   Current Meds:  Current Medications[2]     HISTORY:  Past Medical History[3]   Past Surgical History[4]   Family History[5]   Short Social Hx on File[6]     NARRATIVE:   /71   Pulse 84   Wt 179 lb (81.2 kg)   LMP 2024 (Approximate)   BMI 36.15 kg/m²            Alert and Oriented.  No acute distress          Abdomen:  soft, nontender, no contractions noted.           extremities:  nontender, no edema              DISCUSSION  During her visit we discussed and reviewed the following issues:    OBESITY:  Obesity during pregnancy is associated with numerous maternal and  risks.  It is not clear whether obesity is a direct cause of adverse pregnancy outcome or whether the association between obesity and adverse pregnancy outcome is due to factors such as diabetes mellitus.   Data suggest that obese women should be encouraged to undertake a weight reduction program (diet, exercise, behavior modification, and possibly bariatric surgery in some cases) prior to attempting to conceive.            Subfertility in obese women is most commonly related to ovulatory dysfunction, and, in some obese women, the ovulatory dysfunction is related to polycystic ovary syndrome (PCOS). It is also important to note that even among  ovulatory women, increasing obesity is associated with decreasing spontaneous pregnancy rates.  The increased risk of miscarriage in obese women may be because such women often have PCOS or isolated insulin resistance.                 Due to its strong association with obesity in the general population, type 2 diabetes mellitus is one of the two most common medical complications of the obese . The increased risk of type 2 diabetes is primarily related to an exaggerated increase in insulin resistance in the obese state. It is reasonable to screen obese gravidas for undiagnosed pregestational diabetes in the first trimester.   Glucose intolerance associated with gestational diabetes generally resolves postpartum; however, obese women with a history of gestational diabetes have a two-fold increased prevalence of subsequent type 2 diabetes.           An association between obesity and hypertensive disorders during pregnancy has been consistently reported.  In particular, maternal weight and BMI are independent risk factors for preeclampsia.             Studies have found that the increased risk of  birth in obese gravidas is primarily associated with obesity-related medical and  complications, rather than an intrinsic predisposition to spontaneous  birth. Prevention of  birth in these patients, therefore, should be directed toward prevention or management of medical and obstetrical complications.               Both prepregnancy obesity and excessive maternal weight gain before or during pregnancy contribute to an increased probability of  delivery.  It has also been hypothesized that obesity may lead to dystocia due to increased soft tissue deposition in the maternal pelvis.    delivery in the obese  is associated with numerous perioperative concerns, including emergency delivery, prolonged incision to delivery interval, blood loss >1000 mL, longer operative  times, wound infection, thromboembolism, and endometritis.            Maternal obesity appears to be associated with a small increase in the absolute rate of some congenital anomalies, and the risk may increase with increasing maternal weight.  The risk of neural tube defects increased significantly with maternal weight.    The analysis found that overweight and obese pregnant women experienced significantly more stillbirths than normal weight women.      Increase  testing and Level 2 Ultrasound is recommended.       -------------------      CELL-FREE FETAL DNA   Sequencing cfDNA is a screening test; it has false-positive and negative results and does not test for all genetic syndromes or all aneuploidies: it tests for trisomy 21, 18, and 13 and sometimes sex chromosome aneuploidy. Diagnostic procedures, such as amniocentesis, obtain fetal cells, and subsequent testing by karyotyping or microarray can diagnose all aneuploidies; can distinguish between full trisomy and trisomy caused by an unbalanced chromosomal rearrangement; and can detect mosaicism and microdeletions/microduplications (microarray) and, via amniotic fluid AFP and acetylcholinesterase, open neural tube defects. If there are one or more structural anomalies on ultrasound examination, a microarray on amniocytes is the preferred test    Both the mother and the fetal-placental unit produce cfDNA. The primary source of so-called \"fetal\" cfDNA in the maternal circulation is thought to be apoptosis of placental cells (syncytiotrophoblast), while maternal hematopoietic cells are the source of most maternal cfDNA. A lesser source is apoptosis of fetal erythroblasts generating cfDNA in the fetal circulation, and these fragments can cross the placenta and enter the maternal circulation. Since the fetus and the placenta originate from a single fertilized egg, they are usually genetically identical, but differences between the placenta and fetus are  important sources of discordant cfDNA test results (eg, confined placental mosaicism).    Trisomy 21, 18, and 13 -- cfDNA is the most sensitive screening option for these aneuploidies. Performance varies by trisomy and by methodology but is rather similar in both high- and low-risk women. Based on multiple meta-analyses, the consensus DRs and FPRs were as follows:  ?Trisomy 21 - DR 99.5 percent, FPR 0.05 percent   ?Trisomy 18 - DR 97.7 percent, FPR 0.04 percent   ?Trisomy 13 - DR 96.1 percent, FPR 0.06 percent       Low cell fraction  Cell-free DNA test failures may occur because of the complex laboratory processing procedures, early gestational age (less than 9-10 weeks), the types of laboratory methods, and the presence of a genetic condition, particularly trisomy 13 or 18 and are also seen more frequently in patients with high BMI, increasing maternal age, certain racial backgrounds (seen more frequently in Black women and South  women in comparison to white women), and IVF pregnancies, 45 as well as maternal drug exposure (low-molecular-weight heparin)  Some aneuploidies also have a low cell fraction.    1 to 5 percent of cfDNA tests do not yield a result, and obese women are at increased risk of receiving a test failure.  The patient has three options in this setting:  Repeat the cfDNA test, if allowed by the laboratory (some failures, like large regions of homozygosity or dizygotic twins, will always cause the test to fail and repeat testing is not an option). Repeat testing, when allowed, is successful in 50 to 80 percent of cases .  Standard serum marker/ultrasound screening.  Invasive procedure (amniocentesis, CVS) and diagnostic testing (karyotyping/microarray).       False-positive cell-free DNA test results occur because of confined placental mosaicism, which can be associated with an increased risk of fetal growth restriction. High or low fetal fraction has been associated with adverse pregnancy  outcomes in some studies.        OB ULTRASOUND REPORT   See imaging tab for complete ultrasound report     Fetal Heart Rate: Present 153 bpm  Fetal Presentation: Transverse Left  Amniotic fluid MVP: WNL  Placental Location: Posterior       FIRST TRIMESTER SCREENING:    The CRL is consistent with the gestational age.  The nuchal translucency measures   1.7  mm. This is within normal limits.  The nasal bone is present.  Fetus: arms and legs seen suboptimally. Fetal head/skull and abdomen appeared normal. Stomach and bladder seen.   Uterus and adnexa appeared normal      IMPRESSION:   1. IUP @  13w3d  2. Scan consistent with dates  3. No fetal structural abnormalities seen but limited by early gestational age  4.  Obesity BMI 36    RECOMMENDATIONS:   1.  Level 2 US at 20wks  2.  Growth US at 32wks  3.  weekly NST at 36wks  4.  Early 1 hr gtt  5.  11-20 lb weight gain for pregnancy    Thank you for allowing me to participate in the care of your patient.  Please do not hesitate to call with any questions or concerns.     Total time spent   40  minutes this calendar day which includes preparing to see the patient including chart review, obtaining and/or reviewing additional medical history, performing a physical exam and evaluation, documenting clinical information in the electronic medical record, independently interpreting results, counseling the patient, communicating results to the patient/family/caregiver and coordinating care.    Ilan Suresh D.O.  Maternal Fetal Medicine     Note to patient and family:  The 21st Century Cures Act makes medical notes available to patients in the interest of transparency.  However, please be advised that this is a medical document.  It is intended as a peer to peer communication.  It is written in medical language and may contain abbreviations or verbiage that are technical and unfamiliar.  It may appear blunt or direct.  Medical documents are intended to carry relevant  information, facts as evident, and the clinical opinion of the practitioner.         [1] No Known Allergies  [2]   Current Outpatient Medications   Medication Sig Dispense Refill    progesterone 100 MG Oral Cap Place 1 capsule (100 mg total) vaginally 2 (two) times daily. 60 capsule 1    prenatal vitamin with DHA 27-0.8-228 MG Oral Cap Take 1 capsule by mouth daily.      Ferrous Sulfate 325 (65 Fe) MG Oral Tab Take 1 tablet (325 mg total) by mouth in the morning and 1 tablet (325 mg total) before bedtime. With orange juice.. (Patient not taking: Reported on 2/19/2025) 180 tablet 1    valACYclovir 1 G Oral Tab Take 1 tablet (1,000 mg total) by mouth every 12 (twelve) hours. (Patient not taking: Reported on 11/14/2024) 30 tablet 1    naproxen 500 MG Oral Tab Take 1 tablet (500 mg total) by mouth 2 (two) times daily with meals. (Patient not taking: Reported on 9/22/2021) 60 tablet 0   [3]   Past Medical History:   Anemia    Dr pakd me on 11/12/24    Decorative tattoo   [4]   Past Surgical History:  Procedure Laterality Date    D & c      Week of 7/21/23   [5]   Family History  Problem Relation Age of Onset    Hypertension Mother     Diabetes Maternal Grandfather    [6]   Social History  Socioeconomic History    Marital status: Single   Tobacco Use    Smoking status: Never     Passive exposure: Never    Smokeless tobacco: Never   Vaping Use    Vaping status: Never Used   Substance and Sexual Activity    Alcohol use: Not Currently    Drug use: Never    Sexual activity: Yes     Partners: Male     Social Drivers of Health     Food Insecurity: No Food Insecurity (3/12/2025)    NCSS - Food Insecurity     Worried About Running Out of Food in the Last Year: No     Ran Out of Food in the Last Year: No   Transportation Needs: No Transportation Needs (3/12/2025)    NCSS - Transportation     Lack of Transportation: No   Stress: No Stress Concern Present (3/12/2025)    Stress     Feeling of Stress : No   Housing Stability: Not  At Risk (3/12/2025)    NCSS - Housing/Utilities     Has Housing: Yes     Worried About Losing Housing: No     Unable to Get Utilities: No   Recent Concern: Housing Stability - At Risk (2/19/2025)    NCSS - Housing/Utilities     Has Housing: No     Worried About Losing Housing: No     Unable to Get Utilities: No

## 2025-05-05 ENCOUNTER — TELEPHONE (OUTPATIENT)
Dept: PERINATAL CARE | Facility: HOSPITAL | Age: 25
End: 2025-05-05

## 2025-05-05 NOTE — TELEPHONE ENCOUNTER
Returned pt call RE scheduling.  No answer  Left Voice mail to call back with [facility] number  [phone]

## 2025-05-06 ENCOUNTER — ROUTINE PRENATAL (OUTPATIENT)
Dept: OBGYN CLINIC | Facility: CLINIC | Age: 25
End: 2025-05-06
Payer: COMMERCIAL

## 2025-05-06 ENCOUNTER — LAB ENCOUNTER (OUTPATIENT)
Dept: LAB | Facility: HOSPITAL | Age: 25
End: 2025-05-06
Attending: OBSTETRICS & GYNECOLOGY
Payer: COMMERCIAL

## 2025-05-06 VITALS — SYSTOLIC BLOOD PRESSURE: 130 MMHG | WEIGHT: 186.38 LBS | BODY MASS INDEX: 38 KG/M2 | DIASTOLIC BLOOD PRESSURE: 84 MMHG

## 2025-05-06 DIAGNOSIS — Z34.82 ENCOUNTER FOR SUPERVISION OF OTHER NORMAL PREGNANCY IN SECOND TRIMESTER (HCC): Primary | ICD-10-CM

## 2025-05-06 DIAGNOSIS — Z34.81 PRENATAL CARE, SUBSEQUENT PREGNANCY, FIRST TRIMESTER (HCC): ICD-10-CM

## 2025-05-06 PROCEDURE — 3075F SYST BP GE 130 - 139MM HG: CPT | Performed by: OBSTETRICS & GYNECOLOGY

## 2025-05-06 PROCEDURE — 3079F DIAST BP 80-89 MM HG: CPT | Performed by: OBSTETRICS & GYNECOLOGY

## 2025-05-10 LAB
GESTATIONAL AGE > OR = 9W:: YES
MONOSOMY X (TURNER SYNDROME): NOT DETECTED
TEST RESULT: NEGATIVE
TRISOMY 13 (PATAU SYNDROME): NEGATIVE
TRISOMY 18 (EDWARDS SYNDROME): NEGATIVE
TRISOMY 21 (DOWN SYNDROME): NEGATIVE
XXX (TRIPLE X SYNDROME): NOT DETECTED
XXY (KLINEFELTER SYNDROME): NOT DETECTED
XYY (JACOBS SYNDROME): NOT DETECTED

## 2025-05-21 NOTE — PROGRESS NOTES
Lorri Pritchard is a 25 year old female  Patient's last menstrual period was 2024 (approximate).   Chief Complaint   Patient presents with    Prenatal Care       OBSTETRICS HISTORY:     OB History    Para Term  AB Living   2 0 0 0 1 0   SAB IAB Ectopic Multiple Live Births   1          # Outcome Date GA Lbr Mark/2nd Weight Sex Type Anes PTL Lv   2 Current            1 SAB 23 7w0d    SAB   FD       GYNE HISTORY:         Menarche: 9y/o (2025  9:20 AM)  Period Cycle (Days): Irregular (2025  9:20 AM)  Period Duration (Days): 5-7days (2025  9:20 AM)  Period Flow: Heavy to light (2025  9:20 AM)  Use of Birth Control (if yes, specify type): None (2025  9:20 AM)  Hx Prior Abnormal Pap: No (2025  9:20 AM)  Pap Date: 24 (2025  9:20 AM)  Pap Result Notes: normal (2025  9:20 AM)        Latest Ref Rng & Units 2024    11:45 AM 2021     3:32 PM   RECENT PAP RESULTS   INTERPRETATION/RESULT: Negative for intraepithelial lesion or malignancy Negative for intraepithelial lesion or malignancy  Negative for intraepithelial lesion or malignancy    HPV Negative  Negative          MEDICAL HISTORY:     Past Medical History[1]    SURGICAL HISTORY:     Past Surgical History[2]    SOCIAL HISTORY:     Short Social Hx on File[3]     FAMILY HISTORY:     Family History[4]    MEDICATIONS:     Medications - Current[5]    ALLERGIES:     Allergies[6]      REVIEW OF SYSTEMS:     Constitutional:    denies fever / chills  Cardiovascular:  denies chest pain or palpitations  Respiratory:    denies shortness of breath  Gastrointestinal:  denies severe abdominal pain, frequent diarrhea or constipation, nausea / vomiting  Genitourinary:    denies dysuria, bothersome incontinence  Skin/Breast:   denies any breast pain, lumps, or discharge  Neurological:    denies frequent severe headaches  Psychiatric:   denies depression or anxiety, thoughts of harming self or  others      PHYSICAL EXAM:   Blood pressure 130/84, weight 186 lb 6.4 oz (84.6 kg), last menstrual period 12/20/2024.  Constitutional:  well developed, well nourished, no distress  Abdomen:   soft, gravid, nontender  Musculoskeletal: no cva tenderness bilaterally  Skin/Hair:  no unusual rashes or bruises  Extremities:  no edema, no cyanosis, non tender bilaterally  Psychiatric:   oriented to time, place, person and situation. Appropriate mood and affect      ASSESSMENT & PLAN:     There are no diagnoses linked to this encounter.      FOLLOW-UP     No follow-ups on file.      Ajith Robert MD  5/21/2025         [1]   Past Medical History:   Anemia    Dr pakd me on 11/12/24    Decorative tattoo   [2]   Past Surgical History:  Procedure Laterality Date    D & c      Week of 7/21/23   [3]   Social History  Socioeconomic History    Marital status: Single   Tobacco Use    Smoking status: Never     Passive exposure: Never    Smokeless tobacco: Never   Vaping Use    Vaping status: Never Used   Substance and Sexual Activity    Alcohol use: Not Currently    Drug use: Never    Sexual activity: Yes     Partners: Male     Social Drivers of Health     Food Insecurity: No Food Insecurity (3/12/2025)    NCSS - Food Insecurity     Worried About Running Out of Food in the Last Year: No     Ran Out of Food in the Last Year: No   Transportation Needs: No Transportation Needs (3/12/2025)    NCSS - Transportation     Lack of Transportation: No   Stress: No Stress Concern Present (3/12/2025)    Stress     Feeling of Stress : No   Housing Stability: Not At Risk (3/12/2025)    NCSS - Housing/Utilities     Has Housing: Yes     Worried About Losing Housing: No     Unable to Get Utilities: No   Recent Concern: Housing Stability - At Risk (2/19/2025)    NCSS - Housing/Utilities     Has Housing: No     Worried About Losing Housing: No     Unable to Get Utilities: No   [4]   Family History  Problem Relation Age of Onset    Hypertension  Mother     Diabetes Maternal Grandfather    [5]   Current Outpatient Medications:     prenatal vitamin with DHA 27-0.8-228 MG Oral Cap, Take 1 capsule by mouth daily., Disp: , Rfl:   [6] No Known Allergies

## 2025-06-02 ENCOUNTER — TELEPHONE (OUTPATIENT)
Dept: PERINATAL CARE | Facility: HOSPITAL | Age: 25
End: 2025-06-02

## 2025-06-02 ENCOUNTER — HOSPITAL ENCOUNTER (OUTPATIENT)
Dept: PERINATAL CARE | Facility: HOSPITAL | Age: 25
Discharge: HOME | End: 2025-06-02
Attending: OBSTETRICS & GYNECOLOGY
Payer: COMMERCIAL

## 2025-06-02 ENCOUNTER — HOSPITAL ENCOUNTER (OUTPATIENT)
Dept: PERINATAL CARE | Facility: HOSPITAL | Age: 25
End: 2025-06-02
Attending: OBSTETRICS & GYNECOLOGY
Payer: COMMERCIAL

## 2025-06-02 VITALS
BODY MASS INDEX: 39 KG/M2 | DIASTOLIC BLOOD PRESSURE: 71 MMHG | HEART RATE: 92 BPM | SYSTOLIC BLOOD PRESSURE: 116 MMHG | WEIGHT: 191 LBS

## 2025-06-02 DIAGNOSIS — R03.0 ELEVATED BP WITHOUT DIAGNOSIS OF HYPERTENSION: Primary | ICD-10-CM

## 2025-06-02 DIAGNOSIS — D27.0 DERMOID CYST OF RIGHT OVARY: ICD-10-CM

## 2025-06-02 DIAGNOSIS — O99.210: ICD-10-CM

## 2025-06-02 DIAGNOSIS — R03.0 ELEVATED BP WITHOUT DIAGNOSIS OF HYPERTENSION: ICD-10-CM

## 2025-06-02 DIAGNOSIS — D27.9 DERMOID CYST OF OVARY: ICD-10-CM

## 2025-06-02 PROCEDURE — 76811 OB US DETAILED SNGL FETUS: CPT | Performed by: OBSTETRICS & GYNECOLOGY

## 2025-06-02 NOTE — PROGRESS NOTES
Reason for Consult:   Dear Dr. Robert,    Thank you for requesting ultrasound evaluation and maternal fetal medicine consultation on Lorri Pritchard.  As you are aware she is a 25 year old female with a Givens pregnancy .  A maternal-fetal medicine consultation was requested secondary to  obesity.  Her prenatal records and labs were reviewed.    Review of History:     OB History:    OB History    Para Term  AB Living   2 0 0 0 1 0   SAB IAB Ectopic Multiple Live Births   1 0 0 0 0      # Outcome Date GA Lbr Mark/2nd Weight Sex Type Anes PTL Lv   2 Current            1 SAB 23 7w0d    SAB   FD             Allergies:  Allergies[1]   Current Meds:  Current Medications[2]     HISTORY:  Past Medical History[3]   Past Surgical History[4]   Family History[5]   Short Social Hx on File[6]     NARRATIVE:   /71   Pulse 92   Wt 191 lb (86.6 kg)   LMP 2024 (Approximate)   BMI 38.58 kg/m²            Alert and Oriented.  No acute distress          Abdomen:  soft, nontender, no contractions noted.           extremities:  nontender, no edema              DISCUSSION  During her visit we discussed and reviewed the following issues:    OBESITY:    Obesity during pregnancy is associated with numerous maternal and  risks.  It is not clear whether obesity is a direct cause of adverse pregnancy outcome or whether the association between obesity and adverse pregnancy outcome is due to factors such as diabetes mellitus.   Data suggest that obese women should be encouraged to undertake a weight reduction program (diet, exercise, behavior modification, and possibly bariatric surgery in some cases) prior to attempting to conceive.                OB ULTRASOUND REPORT   See imaging tab for complete ultrasound report or in PACS    Single IUP in breech presentation.    Placenta is posterior.   A 3 vessel cord is noted.  Insertion site: normal insertion  Cardiac activity is present at 142 bpm  EFW  469 g ( 1 lb 1 oz);   MVP is 4.6 cm    Cervix  measured  36.9 mm transabdominally   Uterus and adnexa appeared normal today on ultrasound    Right Ovary  Visualized  Morphology: Dermoid  Cyst(s) Size 76 mm x 46 mm x 83 mm. Mean 68.3 mm. Vol 151.932 cm³. Dermoid      Fetal Anatomy:  Visualized with normal appearance: head, face, spine, neck, skin, chest, abdominal wall, gastrointestinal tract, kidneys, bladder, extremities.   Brain: Visualized and normal appearances: brain parenchyma, cerebral ventricles, choroid plexus, Cisterna Magna, midline falx, cerebellum, cerebellar lobes, posterior fossa, vermis, cavum septi pellucidi.  Face: eyes normal, profile normal, nose normal, lip normal, palate normal.  Heart: visualized and normal appearance: 3 vessel view, four-chamber, left outflow tract, right outflow tract, arches.      Genetic Sonogram:  Nuchal fold normal.  Pylelectasis absent.  No hyperechogenic bowel.  Echogenic intracardiac foci absent. Nasal bone present. Choroid plexus cyst absent.      Summary of Ultrasound findings:  This is a Givens pregnancy    The fetal measurements are consistent with established EDC. No gross ultrasound evidence of structural abnormalities are seen today. No minor markers for aneuploidy are seen. The patient understands that ultrasound cannot rule out all structural and chromosomal abnormalities.         IMPRESSION:   1. IUP @  21w1d   2. Scan consistent with dates  3. No fetal structural abnormalities seen   4.  Obesity BMI 36  5. Possible right dermoid cyst --   OB to follow    RECOMMENDATIONS:   1.  11-20 lb weight gain for pregnancy  2.  Growth US at 32wks  3.  weekly NST at 36wks      Thank you for allowing me to participate in the care of your patient.  Please do not hesitate to call with any questions or concerns.     Total time spent   40  minutes this calendar day which includes preparing to see the patient including chart review, obtaining and/or reviewing additional  medical history, performing a physical exam and evaluation, documenting clinical information in the electronic medical record, independently interpreting results, counseling the patient, communicating results to the patient/family/caregiver and coordinating care.    Ilan Suresh D.O.  Maternal Fetal Medicine     Note to patient and family:  The 21st Century Cures Act makes medical notes available to patients in the interest of transparency.  However, please be advised that this is a medical document.  It is intended as a peer to peer communication.  It is written in medical language and may contain abbreviations or verbiage that are technical and unfamiliar.  It may appear blunt or direct.  Medical documents are intended to carry relevant information, facts as evident, and the clinical opinion of the practitioner.         [1] No Known Allergies  [2]   Current Outpatient Medications   Medication Sig Dispense Refill    prenatal vitamin with DHA 27-0.8-228 MG Oral Cap Take 1 capsule by mouth daily.     [3]   Past Medical History:   Anemia     messaged me on 11/12/24    Decorative tattoo   [4]   Past Surgical History:  Procedure Laterality Date    D & c      Week of 7/21/23   [5]   Family History  Problem Relation Age of Onset    Hypertension Mother     Diabetes Maternal Grandfather    [6]   Social History  Socioeconomic History    Marital status: Single   Tobacco Use    Smoking status: Never     Passive exposure: Never    Smokeless tobacco: Never   Vaping Use    Vaping status: Never Used   Substance and Sexual Activity    Alcohol use: Not Currently    Drug use: Never    Sexual activity: Yes     Partners: Male     Social Drivers of Health     Food Insecurity: No Food Insecurity (3/12/2025)    NCSS - Food Insecurity     Worried About Running Out of Food in the Last Year: No     Ran Out of Food in the Last Year: No   Transportation Needs: No Transportation Needs (3/12/2025)    NCSS - Transportation     Lack of  Transportation: No   Stress: No Stress Concern Present (3/12/2025)    Stress     Feeling of Stress : No   Housing Stability: Not At Risk (3/12/2025)    NCSS - Housing/Utilities     Has Housing: Yes     Worried About Losing Housing: No     Unable to Get Utilities: No   Recent Concern: Housing Stability - At Risk (2/19/2025)    NCSS - Housing/Utilities     Has Housing: No     Worried About Losing Housing: No     Unable to Get Utilities: No

## 2025-07-01 ENCOUNTER — ROUTINE PRENATAL (OUTPATIENT)
Dept: OBGYN CLINIC | Facility: CLINIC | Age: 25
End: 2025-07-01
Payer: COMMERCIAL

## 2025-07-01 VITALS — SYSTOLIC BLOOD PRESSURE: 128 MMHG | DIASTOLIC BLOOD PRESSURE: 83 MMHG | BODY MASS INDEX: 41 KG/M2 | WEIGHT: 200.63 LBS

## 2025-07-01 DIAGNOSIS — Z34.83 ENCOUNTER FOR SUPERVISION OF OTHER NORMAL PREGNANCY IN THIRD TRIMESTER (HCC): Primary | ICD-10-CM

## 2025-07-01 PROCEDURE — 3079F DIAST BP 80-89 MM HG: CPT | Performed by: OBSTETRICS & GYNECOLOGY

## 2025-07-01 PROCEDURE — 3074F SYST BP LT 130 MM HG: CPT | Performed by: OBSTETRICS & GYNECOLOGY

## 2025-07-01 NOTE — PROGRESS NOTES
Lorri Pritchard is a 25 year old female  Patient's last menstrual period was 2024 (approximate).   Chief Complaint   Patient presents with    Prenatal Care       OBSTETRICS HISTORY:     OB History    Para Term  AB Living   2 0 0 0 1 0   SAB IAB Ectopic Multiple Live Births   1          # Outcome Date GA Lbr Mark/2nd Weight Sex Type Anes PTL Lv   2 Current            1 SAB 23 7w0d    SAB   FD       GYNE HISTORY:         Menarche: 7y/o (2025  9:20 AM)  Period Cycle (Days): Irregular (2025  9:20 AM)  Period Duration (Days): 5-7days (2025  9:20 AM)  Period Flow: Heavy to light (2025  9:20 AM)  Use of Birth Control (if yes, specify type): None (2025  9:20 AM)  Hx Prior Abnormal Pap: No (2025  9:20 AM)  Pap Date: 24 (2025  9:20 AM)  Pap Result Notes: normal (2025  9:20 AM)        Latest Ref Rng & Units 2024    11:45 AM 2021     3:32 PM   RECENT PAP RESULTS   INTERPRETATION/RESULT: Negative for intraepithelial lesion or malignancy Negative for intraepithelial lesion or malignancy  Negative for intraepithelial lesion or malignancy    HPV Negative  Negative          MEDICAL HISTORY:     Past Medical History[1]    SURGICAL HISTORY:     Past Surgical History[2]    SOCIAL HISTORY:     Short Social Hx on File[3]     FAMILY HISTORY:     Family History[4]    MEDICATIONS:     Medications - Current[5]    ALLERGIES:     Allergies[6]      REVIEW OF SYSTEMS:     Constitutional:    denies fever / chills  Cardiovascular:  denies chest pain or palpitations  Respiratory:    denies shortness of breath  Gastrointestinal:  denies severe abdominal pain, frequent diarrhea or constipation, nausea / vomiting  Genitourinary:    denies dysuria, bothersome incontinence  Skin/Breast:   denies any breast pain, lumps, or discharge  Neurological:    denies frequent severe headaches  Psychiatric:   denies depression or anxiety, thoughts of harming self or  others      PHYSICAL EXAM:   Blood pressure 128/83, weight 200 lb 9.6 oz (91 kg), last menstrual period 12/20/2024.  Constitutional:  well developed, well nourished, no distress  Abdomen:   soft, gravid, nontender  Musculoskeletal: no cva tenderness bilaterally  Skin/Hair:  no unusual rashes or bruises  Extremities:  no edema, no cyanosis, non tender bilaterally  Psychiatric:   oriented to time, place, person and situation. Appropriate mood and affect      ASSESSMENT & PLAN:     Lorri was seen today for prenatal care.    Diagnoses and all orders for this visit:    Encounter for supervision of other normal pregnancy in second trimester (HCC)  -     CBC W Differential W Platelet; Future  -     TREP; Future  -     HIV AG AB Combo; Future  -     Glucose 1 HR OB; Future  -     Ferritin; Future          FOLLOW-UP     No follow-ups on file.      Ajith Robert MD  7/1/2025         [1]   Past Medical History:   Anemia    Dr pakd me on 11/12/24    Decorative tattoo   [2]   Past Surgical History:  Procedure Laterality Date    D & c      Week of 7/21/23   [3]   Social History  Socioeconomic History    Marital status: Single   Tobacco Use    Smoking status: Never     Passive exposure: Never    Smokeless tobacco: Never   Vaping Use    Vaping status: Never Used   Substance and Sexual Activity    Alcohol use: Not Currently    Drug use: Never    Sexual activity: Yes     Partners: Male     Social Drivers of Health     Food Insecurity: No Food Insecurity (3/12/2025)    NCSS - Food Insecurity     Worried About Running Out of Food in the Last Year: No     Ran Out of Food in the Last Year: No   Transportation Needs: No Transportation Needs (3/12/2025)    NCSS - Transportation     Lack of Transportation: No   Stress: No Stress Concern Present (3/12/2025)    Stress     Feeling of Stress : No   Housing Stability: Not At Risk (3/12/2025)    NCSS - Housing/Utilities     Has Housing: Yes     Worried About Losing Housing: No      Unable to Get Utilities: No   Recent Concern: Housing Stability - At Risk (2/19/2025)    NCSS - Housing/Utilities     Has Housing: No     Worried About Losing Housing: No     Unable to Get Utilities: No   [4]   Family History  Problem Relation Age of Onset    Hypertension Mother     Diabetes Maternal Grandfather    [5]   Current Outpatient Medications:     prenatal vitamin with DHA 27-0.8-228 MG Oral Cap, Take 1 capsule by mouth daily., Disp: , Rfl:   [6] No Known Allergies

## 2025-07-15 ENCOUNTER — ROUTINE PRENATAL (OUTPATIENT)
Dept: OBGYN CLINIC | Facility: CLINIC | Age: 25
End: 2025-07-15
Payer: COMMERCIAL

## 2025-07-15 VITALS
BODY MASS INDEX: 41 KG/M2 | SYSTOLIC BLOOD PRESSURE: 105 MMHG | HEART RATE: 85 BPM | DIASTOLIC BLOOD PRESSURE: 68 MMHG | WEIGHT: 204 LBS

## 2025-07-15 DIAGNOSIS — Z34.83 ENCOUNTER FOR SUPERVISION OF OTHER NORMAL PREGNANCY IN THIRD TRIMESTER (HCC): Primary | ICD-10-CM

## 2025-07-15 PROCEDURE — 3078F DIAST BP <80 MM HG: CPT | Performed by: OBSTETRICS & GYNECOLOGY

## 2025-07-15 PROCEDURE — 3074F SYST BP LT 130 MM HG: CPT | Performed by: OBSTETRICS & GYNECOLOGY

## 2025-07-15 NOTE — PROGRESS NOTES
Lorri Pritchard is a 25 year old female  Patient's last menstrual period was 2024 (approximate).   Chief Complaint   Patient presents with    Prenatal Care     Patient presents for return ob, no concerns.           OBSTETRICS HISTORY:     OB History    Para Term  AB Living   2 0 0 0 1 0   SAB IAB Ectopic Multiple Live Births   1          # Outcome Date GA Lbr Mark/2nd Weight Sex Type Anes PTL Lv   2 Current            1 SAB 23 7w0d    SAB   FD       GYNE HISTORY:         Menarche: 7y/o (2025  9:20 AM)  Period Cycle (Days): Irregular (2025  9:20 AM)  Period Duration (Days): 5-7days (2025  9:20 AM)  Period Flow: Heavy to light (2025  9:20 AM)  Use of Birth Control (if yes, specify type): None (2025  9:20 AM)  Hx Prior Abnormal Pap: No (2025  9:20 AM)  Pap Date: 24 (2025  9:20 AM)  Pap Result Notes: normal (2025  9:20 AM)        Latest Ref Rng & Units 2024    11:45 AM 2021     3:32 PM   RECENT PAP RESULTS   INTERPRETATION/RESULT: Negative for intraepithelial lesion or malignancy Negative for intraepithelial lesion or malignancy  Negative for intraepithelial lesion or malignancy    HPV Negative  Negative          MEDICAL HISTORY:     Past Medical History[1]    SURGICAL HISTORY:     Past Surgical History[2]    SOCIAL HISTORY:     Short Social Hx on File[3]     FAMILY HISTORY:     Family History[4]    MEDICATIONS:     Medications - Current[5]    ALLERGIES:     Allergies[6]      REVIEW OF SYSTEMS:     Constitutional:    denies fever / chills  Cardiovascular:  denies chest pain or palpitations  Respiratory:    denies shortness of breath  Gastrointestinal:  denies severe abdominal pain, frequent diarrhea or constipation, nausea / vomiting  Genitourinary:    denies dysuria, bothersome incontinence  Skin/Breast:   denies any breast pain, lumps, or discharge  Neurological:    denies frequent severe headaches  Psychiatric:   denies  depression or anxiety, thoughts of harming self or others      PHYSICAL EXAM:   Blood pressure 105/68, pulse 85, weight 204 lb (92.5 kg), last menstrual period 12/20/2024.  Constitutional:  well developed, well nourished, no distress  Abdomen:   soft, gravid, nontender  Musculoskeletal: no cva tenderness bilaterally  Skin/Hair:  no unusual rashes or bruises  Extremities:  no edema, no cyanosis, non tender bilaterally  Psychiatric:   oriented to time, place, person and situation. Appropriate mood and affect      ASSESSMENT & PLAN:     There are no diagnoses linked to this encounter.    Good fm. No c/o.   FOLLOW-UP     No follow-ups on file.      Ajith Robert MD  7/15/2025         [1]   Past Medical History:   Anemia    Dr nguyen me on 11/12/24    Decorative tattoo   [2]   Past Surgical History:  Procedure Laterality Date    D & c      Week of 7/21/23   [3]   Social History  Socioeconomic History    Marital status: Single   Tobacco Use    Smoking status: Never     Passive exposure: Never    Smokeless tobacco: Never   Vaping Use    Vaping status: Never Used   Substance and Sexual Activity    Alcohol use: Not Currently    Drug use: Never    Sexual activity: Yes     Partners: Male     Social Drivers of Health     Food Insecurity: No Food Insecurity (3/12/2025)    NCSS - Food Insecurity     Worried About Running Out of Food in the Last Year: No     Ran Out of Food in the Last Year: No   Transportation Needs: No Transportation Needs (3/12/2025)    NCSS - Transportation     Lack of Transportation: No   Stress: No Stress Concern Present (3/12/2025)    Stress     Feeling of Stress : No   Housing Stability: Not At Risk (3/12/2025)    NCSS - Housing/Utilities     Has Housing: Yes     Worried About Losing Housing: No     Unable to Get Utilities: No   Recent Concern: Housing Stability - At Risk (2/19/2025)    NCSS - Housing/Utilities     Has Housing: No     Worried About Losing Housing: No     Unable to Get Utilities:  No   [4]   Family History  Problem Relation Age of Onset    Hypertension Mother     Diabetes Maternal Grandfather    [5]   Current Outpatient Medications:     prenatal vitamin with DHA 27-0.8-228 MG Oral Cap, Take 1 capsule by mouth daily., Disp: , Rfl:   [6] No Known Allergies

## 2025-07-19 ENCOUNTER — LAB ENCOUNTER (OUTPATIENT)
Dept: LAB | Age: 25
End: 2025-07-19
Attending: OBSTETRICS & GYNECOLOGY
Payer: COMMERCIAL

## 2025-07-19 DIAGNOSIS — Z34.83 ENCOUNTER FOR SUPERVISION OF OTHER NORMAL PREGNANCY IN THIRD TRIMESTER (HCC): ICD-10-CM

## 2025-07-19 LAB
BASOPHILS # BLD AUTO: 0.05 X10(3) UL (ref 0–0.2)
BASOPHILS NFR BLD AUTO: 0.5 %
DEPRECATED HBV CORE AB SER IA-ACNC: 3 NG/ML (ref 50–306)
DEPRECATED RDW RBC AUTO: 40.7 FL (ref 35.1–46.3)
EOSINOPHIL # BLD AUTO: 0.06 X10(3) UL (ref 0–0.7)
EOSINOPHIL NFR BLD AUTO: 0.6 %
ERYTHROCYTE [DISTWIDTH] IN BLOOD BY AUTOMATED COUNT: 14.9 % (ref 11–15)
GLUCOSE 1H P GLC SERPL-MCNC: 105 MG/DL (ref 70–130)
HCT VFR BLD AUTO: 29.9 % (ref 35–48)
HGB BLD-MCNC: 9.2 G/DL (ref 12–16)
IMM GRANULOCYTES # BLD AUTO: 0.07 X10(3) UL (ref 0–1)
IMM GRANULOCYTES NFR BLD: 0.7 %
LYMPHOCYTES # BLD AUTO: 1.52 X10(3) UL (ref 1–4)
LYMPHOCYTES NFR BLD AUTO: 14.3 %
MCH RBC QN AUTO: 23.3 PG (ref 26–34)
MCHC RBC AUTO-ENTMCNC: 30.8 G/DL (ref 31–37)
MCV RBC AUTO: 75.7 FL (ref 80–100)
MONOCYTES # BLD AUTO: 0.8 X10(3) UL (ref 0.1–1)
MONOCYTES NFR BLD AUTO: 7.5 %
NEUTROPHILS # BLD AUTO: 8.12 X10 (3) UL (ref 1.5–7.7)
NEUTROPHILS # BLD AUTO: 8.12 X10(3) UL (ref 1.5–7.7)
NEUTROPHILS NFR BLD AUTO: 76.4 %
PLATELET # BLD AUTO: 271 10(3)UL (ref 150–450)
RBC # BLD AUTO: 3.95 X10(6)UL (ref 3.8–5.3)
T PALLIDUM AB SER QL IA: NONREACTIVE
WBC # BLD AUTO: 10.6 X10(3) UL (ref 4–11)

## 2025-07-19 PROCEDURE — 36415 COLL VENOUS BLD VENIPUNCTURE: CPT

## 2025-07-19 PROCEDURE — 82950 GLUCOSE TEST: CPT

## 2025-07-19 PROCEDURE — 82728 ASSAY OF FERRITIN: CPT

## 2025-07-19 PROCEDURE — 86780 TREPONEMA PALLIDUM: CPT

## 2025-07-19 PROCEDURE — 85025 COMPLETE CBC W/AUTO DIFF WBC: CPT

## 2025-07-19 PROCEDURE — 87389 HIV-1 AG W/HIV-1&-2 AB AG IA: CPT

## 2025-07-29 ENCOUNTER — ROUTINE PRENATAL (OUTPATIENT)
Dept: OBGYN CLINIC | Facility: CLINIC | Age: 25
End: 2025-07-29

## 2025-07-29 VITALS — BODY MASS INDEX: 42 KG/M2 | DIASTOLIC BLOOD PRESSURE: 83 MMHG | SYSTOLIC BLOOD PRESSURE: 133 MMHG | WEIGHT: 206 LBS

## 2025-07-29 DIAGNOSIS — Z34.83 ENCOUNTER FOR SUPERVISION OF OTHER NORMAL PREGNANCY IN THIRD TRIMESTER (HCC): Primary | ICD-10-CM

## 2025-07-29 PROCEDURE — 3075F SYST BP GE 130 - 139MM HG: CPT | Performed by: OBSTETRICS & GYNECOLOGY

## 2025-07-29 PROCEDURE — 3079F DIAST BP 80-89 MM HG: CPT | Performed by: OBSTETRICS & GYNECOLOGY

## 2025-08-06 PROBLEM — O99.012 ANEMIA COMPLICATING PREGNANCY IN SECOND TRIMESTER (HCC): Status: ACTIVE | Noted: 2025-08-06

## 2025-08-20 ENCOUNTER — HOSPITAL ENCOUNTER (OUTPATIENT)
Dept: PERINATAL CARE | Facility: HOSPITAL | Age: 25
Discharge: HOME | End: 2025-08-20
Attending: OBSTETRICS & GYNECOLOGY

## 2025-08-20 VITALS
DIASTOLIC BLOOD PRESSURE: 83 MMHG | WEIGHT: 220 LBS | HEART RATE: 96 BPM | BODY MASS INDEX: 44 KG/M2 | SYSTOLIC BLOOD PRESSURE: 127 MMHG

## 2025-08-20 DIAGNOSIS — D27.0 DERMOID CYST OF RIGHT OVARY: ICD-10-CM

## 2025-08-20 DIAGNOSIS — R03.0 ELEVATED BP WITHOUT DIAGNOSIS OF HYPERTENSION: ICD-10-CM

## 2025-08-20 DIAGNOSIS — O99.210 OBESITY IN PREGNANCY (HCC): Primary | ICD-10-CM

## 2025-08-20 DIAGNOSIS — O99.210 OBESITY IN PREGNANCY (HCC): ICD-10-CM

## 2025-08-20 PROCEDURE — 76819 FETAL BIOPHYS PROFIL W/O NST: CPT

## 2025-08-20 PROCEDURE — 76816 OB US FOLLOW-UP PER FETUS: CPT | Performed by: OBSTETRICS & GYNECOLOGY

## (undated) NOTE — LETTER
4/20/2020          To Whom It May Concern:    Bree Low is currently under my medical care. She has the diagnosis of seasonal allergies and is cleared to return to work tomorrow without restrictions.    If you require additional information please co

## (undated) NOTE — LETTER
2/9/2021          To Whom It May Concern:    Katie Perry is currently under my medical care and was seen in clinic today for an acute medical visit. She is cleared to return to work tomorrow, 2/10/2020, without restrictions.      If you require additio

## (undated) NOTE — LETTER
1/27/2020          To Whom It May Concern:    Noreen Monteiro is currently under my medical care and was seen in clinic today. She is cleared to return to work tomorrow, 1/28/20, with no restrictions.      If you require additional information please conta

## (undated) NOTE — LETTER
2/1/2021          To Whom It May Concern:    Duke Mcclain is currently under my medical care and was seen today for an acute visit. Please excuse her absence from 2/1/2021 to 2/3/2021.   Patient has a follow-up appointment with me on 2/3/2021, she will

## (undated) NOTE — LETTER
3/3/2021          To Whom It May Concern:    Son Tyler is currently under my medical care and and was seen in clinic for an acute medical visit.   For the next 7 days, I am advising patient against any heavy lifting or long periods of standing at wor

## (undated) NOTE — MR AVS SNAPSHOT
After Visit Summary   9/22/2021    [place]   MRN: [id number]           Visit Information     Date & Time  9/22/2021  3:00 PM Provider  Jostin Ellis, [address] [place] [address] Dr, [facility].  Phone  [phone]      You a doctor face-to-face using your web-cam enabled computer or mobile device wherever you are. Video Visits cost $50 and can be paid hassle-free using a credit, debit, or health savings card. Not active on [employer]? Ask us how to get signed up today!